# Patient Record
Sex: MALE | Race: WHITE | NOT HISPANIC OR LATINO | URBAN - METROPOLITAN AREA
[De-identification: names, ages, dates, MRNs, and addresses within clinical notes are randomized per-mention and may not be internally consistent; named-entity substitution may affect disease eponyms.]

---

## 2017-02-20 ENCOUNTER — OUTPATIENT (OUTPATIENT)
Dept: OUTPATIENT SERVICES | Facility: HOSPITAL | Age: 62
LOS: 1 days | Discharge: HOME | End: 2017-02-20

## 2017-06-27 DIAGNOSIS — E78.00 PURE HYPERCHOLESTEROLEMIA, UNSPECIFIED: ICD-10-CM

## 2017-06-27 DIAGNOSIS — I10 ESSENTIAL (PRIMARY) HYPERTENSION: ICD-10-CM

## 2017-06-27 DIAGNOSIS — E11.9 TYPE 2 DIABETES MELLITUS WITHOUT COMPLICATIONS: ICD-10-CM

## 2017-06-27 DIAGNOSIS — E66.9 OBESITY, UNSPECIFIED: ICD-10-CM

## 2017-06-27 DIAGNOSIS — K43.9 VENTRAL HERNIA WITHOUT OBSTRUCTION OR GANGRENE: ICD-10-CM

## 2017-06-27 DIAGNOSIS — K43.6 OTHER AND UNSPECIFIED VENTRAL HERNIA WITH OBSTRUCTION, WITHOUT GANGRENE: ICD-10-CM

## 2019-02-27 ENCOUNTER — INPATIENT (INPATIENT)
Facility: HOSPITAL | Age: 64
LOS: 2 days | Discharge: HOME HEALTH | End: 2019-03-02
Attending: INTERNAL MEDICINE | Admitting: INTERNAL MEDICINE
Payer: COMMERCIAL

## 2019-02-27 VITALS
HEART RATE: 79 BPM | RESPIRATION RATE: 18 BRPM | TEMPERATURE: 98 F | SYSTOLIC BLOOD PRESSURE: 159 MMHG | OXYGEN SATURATION: 98 % | DIASTOLIC BLOOD PRESSURE: 75 MMHG

## 2019-02-27 DIAGNOSIS — Z98.890 OTHER SPECIFIED POSTPROCEDURAL STATES: Chronic | ICD-10-CM

## 2019-02-27 LAB
ALBUMIN SERPL ELPH-MCNC: 3.7 G/DL — SIGNIFICANT CHANGE UP (ref 3.5–5.2)
ALP SERPL-CCNC: 92 U/L — SIGNIFICANT CHANGE UP (ref 30–115)
ALT FLD-CCNC: 13 U/L — SIGNIFICANT CHANGE UP (ref 0–41)
ANION GAP SERPL CALC-SCNC: 13 MMOL/L — SIGNIFICANT CHANGE UP (ref 7–14)
APTT BLD: 27.4 SEC — SIGNIFICANT CHANGE UP (ref 27–39.2)
AST SERPL-CCNC: 10 U/L — SIGNIFICANT CHANGE UP (ref 0–41)
BASOPHILS # BLD AUTO: 0.04 K/UL — SIGNIFICANT CHANGE UP (ref 0–0.2)
BASOPHILS NFR BLD AUTO: 0.3 % — SIGNIFICANT CHANGE UP (ref 0–1)
BILIRUB SERPL-MCNC: <0.2 MG/DL — SIGNIFICANT CHANGE UP (ref 0.2–1.2)
BLD GP AB SCN SERPL QL: SIGNIFICANT CHANGE UP
BUN SERPL-MCNC: 16 MG/DL — SIGNIFICANT CHANGE UP (ref 10–20)
CALCIUM SERPL-MCNC: 9.2 MG/DL — SIGNIFICANT CHANGE UP (ref 8.5–10.1)
CHLORIDE SERPL-SCNC: 103 MMOL/L — SIGNIFICANT CHANGE UP (ref 98–110)
CO2 SERPL-SCNC: 23 MMOL/L — SIGNIFICANT CHANGE UP (ref 17–32)
CREAT SERPL-MCNC: 0.9 MG/DL — SIGNIFICANT CHANGE UP (ref 0.7–1.5)
EOSINOPHIL # BLD AUTO: 0.45 K/UL — SIGNIFICANT CHANGE UP (ref 0–0.7)
EOSINOPHIL NFR BLD AUTO: 3 % — SIGNIFICANT CHANGE UP (ref 0–8)
GLUCOSE SERPL-MCNC: 185 MG/DL — HIGH (ref 70–99)
HCT VFR BLD CALC: 37 % — LOW (ref 42–52)
HGB BLD-MCNC: 11.7 G/DL — LOW (ref 14–18)
IMM GRANULOCYTES NFR BLD AUTO: 0.5 % — HIGH (ref 0.1–0.3)
INR BLD: 1.16 RATIO — SIGNIFICANT CHANGE UP (ref 0.65–1.3)
LYMPHOCYTES # BLD AUTO: 1.34 K/UL — SIGNIFICANT CHANGE UP (ref 1.2–3.4)
LYMPHOCYTES # BLD AUTO: 8.9 % — LOW (ref 20.5–51.1)
MCHC RBC-ENTMCNC: 27.1 PG — SIGNIFICANT CHANGE UP (ref 27–31)
MCHC RBC-ENTMCNC: 31.6 G/DL — LOW (ref 32–37)
MCV RBC AUTO: 85.6 FL — SIGNIFICANT CHANGE UP (ref 80–94)
MONOCYTES # BLD AUTO: 1.08 K/UL — HIGH (ref 0.1–0.6)
MONOCYTES NFR BLD AUTO: 7.2 % — SIGNIFICANT CHANGE UP (ref 1.7–9.3)
NEUTROPHILS # BLD AUTO: 12.03 K/UL — HIGH (ref 1.4–6.5)
NEUTROPHILS NFR BLD AUTO: 80.1 % — HIGH (ref 42.2–75.2)
NRBC # BLD: 0 /100 WBCS — SIGNIFICANT CHANGE UP (ref 0–0)
PLATELET # BLD AUTO: 388 K/UL — SIGNIFICANT CHANGE UP (ref 130–400)
POTASSIUM SERPL-MCNC: 4.2 MMOL/L — SIGNIFICANT CHANGE UP (ref 3.5–5)
POTASSIUM SERPL-SCNC: 4.2 MMOL/L — SIGNIFICANT CHANGE UP (ref 3.5–5)
PROT SERPL-MCNC: 6.9 G/DL — SIGNIFICANT CHANGE UP (ref 6–8)
PROTHROM AB SERPL-ACNC: 13.3 SEC — HIGH (ref 9.95–12.87)
RBC # BLD: 4.32 M/UL — LOW (ref 4.7–6.1)
RBC # FLD: 14.8 % — HIGH (ref 11.5–14.5)
SODIUM SERPL-SCNC: 139 MMOL/L — SIGNIFICANT CHANGE UP (ref 135–146)
TYPE + AB SCN PNL BLD: SIGNIFICANT CHANGE UP
WBC # BLD: 15.02 K/UL — HIGH (ref 4.8–10.8)
WBC # FLD AUTO: 15.02 K/UL — HIGH (ref 4.8–10.8)

## 2019-02-27 RX ORDER — DEXTROSE 50 % IN WATER 50 %
25 SYRINGE (ML) INTRAVENOUS ONCE
Qty: 0 | Refills: 0 | Status: DISCONTINUED | OUTPATIENT
Start: 2019-02-27 | End: 2019-03-02

## 2019-02-27 RX ORDER — INSULIN LISPRO 100/ML
VIAL (ML) SUBCUTANEOUS
Qty: 0 | Refills: 0 | Status: DISCONTINUED | OUTPATIENT
Start: 2019-02-27 | End: 2019-03-02

## 2019-02-27 RX ORDER — CIPROFLOXACIN LACTATE 400MG/40ML
400 VIAL (ML) INTRAVENOUS EVERY 12 HOURS
Qty: 0 | Refills: 0 | Status: DISCONTINUED | OUTPATIENT
Start: 2019-02-28 | End: 2019-02-28

## 2019-02-27 RX ORDER — INSULIN LISPRO 100/ML
4 VIAL (ML) SUBCUTANEOUS
Qty: 0 | Refills: 0 | Status: DISCONTINUED | OUTPATIENT
Start: 2019-02-27 | End: 2019-03-02

## 2019-02-27 RX ORDER — METRONIDAZOLE 500 MG
500 TABLET ORAL EVERY 8 HOURS
Qty: 0 | Refills: 0 | Status: DISCONTINUED | OUTPATIENT
Start: 2019-02-27 | End: 2019-03-02

## 2019-02-27 RX ORDER — METOPROLOL TARTRATE 50 MG
50 TABLET ORAL
Qty: 0 | Refills: 0 | Status: DISCONTINUED | OUTPATIENT
Start: 2019-02-27 | End: 2019-03-02

## 2019-02-27 RX ORDER — CANAGLIFLOZIN AND METFORMIN HYDROCHLORIDE 50; 500 MG/1; MG/1
0 TABLET, FILM COATED, EXTENDED RELEASE ORAL
Qty: 0 | Refills: 0 | COMMUNITY

## 2019-02-27 RX ORDER — INSULIN GLARGINE 100 [IU]/ML
12 INJECTION, SOLUTION SUBCUTANEOUS EVERY MORNING
Qty: 0 | Refills: 0 | Status: DISCONTINUED | OUTPATIENT
Start: 2019-02-27 | End: 2019-03-02

## 2019-02-27 RX ORDER — MORPHINE SULFATE 50 MG/1
4 CAPSULE, EXTENDED RELEASE ORAL ONCE
Qty: 0 | Refills: 0 | Status: DISCONTINUED | OUTPATIENT
Start: 2019-02-27 | End: 2019-02-27

## 2019-02-27 RX ORDER — SODIUM CHLORIDE 9 MG/ML
1000 INJECTION INTRAMUSCULAR; INTRAVENOUS; SUBCUTANEOUS
Qty: 0 | Refills: 0 | Status: DISCONTINUED | OUTPATIENT
Start: 2019-02-27 | End: 2019-02-28

## 2019-02-27 RX ORDER — CIPROFLOXACIN LACTATE 400MG/40ML
VIAL (ML) INTRAVENOUS
Qty: 0 | Refills: 0 | Status: DISCONTINUED | OUTPATIENT
Start: 2019-02-28 | End: 2019-02-28

## 2019-02-27 RX ORDER — METRONIDAZOLE 500 MG
500 TABLET ORAL EVERY 8 HOURS
Qty: 0 | Refills: 0 | Status: DISCONTINUED | OUTPATIENT
Start: 2019-02-27 | End: 2019-02-28

## 2019-02-27 RX ORDER — ASPIRIN/CALCIUM CARB/MAGNESIUM 324 MG
81 TABLET ORAL DAILY
Qty: 0 | Refills: 0 | Status: DISCONTINUED | OUTPATIENT
Start: 2019-02-27 | End: 2019-03-02

## 2019-02-27 RX ORDER — ACETAMINOPHEN 500 MG
650 TABLET ORAL EVERY 6 HOURS
Qty: 0 | Refills: 0 | Status: DISCONTINUED | OUTPATIENT
Start: 2019-02-27 | End: 2019-03-02

## 2019-02-27 RX ORDER — ENOXAPARIN SODIUM 100 MG/ML
40 INJECTION SUBCUTANEOUS EVERY 24 HOURS
Qty: 0 | Refills: 0 | Status: DISCONTINUED | OUTPATIENT
Start: 2019-02-27 | End: 2019-03-02

## 2019-02-27 RX ORDER — PANTOPRAZOLE SODIUM 20 MG/1
40 TABLET, DELAYED RELEASE ORAL
Qty: 0 | Refills: 0 | Status: DISCONTINUED | OUTPATIENT
Start: 2019-02-27 | End: 2019-03-02

## 2019-02-27 RX ORDER — CIPROFLOXACIN LACTATE 400MG/40ML
VIAL (ML) INTRAVENOUS
Qty: 0 | Refills: 0 | Status: DISCONTINUED | OUTPATIENT
Start: 2019-02-27 | End: 2019-02-28

## 2019-02-27 RX ORDER — CIPROFLOXACIN LACTATE 400MG/40ML
400 VIAL (ML) INTRAVENOUS ONCE
Qty: 0 | Refills: 0 | Status: COMPLETED | OUTPATIENT
Start: 2019-02-27 | End: 2019-02-27

## 2019-02-27 RX ORDER — KETOROLAC TROMETHAMINE 30 MG/ML
15 SYRINGE (ML) INJECTION EVERY 6 HOURS
Qty: 0 | Refills: 0 | Status: DISCONTINUED | OUTPATIENT
Start: 2019-02-27 | End: 2019-02-28

## 2019-02-27 RX ORDER — CIPROFLOXACIN LACTATE 400MG/40ML
500 VIAL (ML) INTRAVENOUS EVERY 12 HOURS
Qty: 0 | Refills: 0 | Status: DISCONTINUED | OUTPATIENT
Start: 2019-02-27 | End: 2019-02-27

## 2019-02-27 RX ADMIN — Medication 100 MILLIGRAM(S): at 21:29

## 2019-02-27 RX ADMIN — SODIUM CHLORIDE 150 MILLILITER(S): 9 INJECTION INTRAMUSCULAR; INTRAVENOUS; SUBCUTANEOUS at 23:04

## 2019-02-27 RX ADMIN — MORPHINE SULFATE 4 MILLIGRAM(S): 50 CAPSULE, EXTENDED RELEASE ORAL at 23:04

## 2019-02-27 RX ADMIN — MORPHINE SULFATE 4 MILLIGRAM(S): 50 CAPSULE, EXTENDED RELEASE ORAL at 22:35

## 2019-02-27 RX ADMIN — Medication 200 MILLIGRAM(S): at 22:35

## 2019-02-27 NOTE — H&P ADULT - ASSESSMENT
63M w/pmhx of DM, HTN, Diverticulosis, Chronic Diarrhea presents from home for chronic diarrhea and abdominal pain. Found to have Colonic Abscess     Colon Abscess:   Continue IV Cipro and Flagyl  Surgery Consult as requested by GI   GI follow up   ID C/s as requested by GI   Family to bring in imaging tomorrow   Regular diet   follow up blood cultures   Pain control with toradol and tylenol     HTN: Changed Bysolic to metoprolol    DM: Basal bolus insulin  keep FSG >180    GI ppx: PPI while taking toradol   DVT ppx: Lovenox   Full Code  Diet Regular   Activity as tolerated 63M w/pmhx of DM, HTN, Diverticulosis, Chronic Diarrhea presents from home for chronic diarrhea and abdominal pain. Found to have Colonic Abscess     Colon Abscess: Low suspicion for C Diff   Continue IV Cipro and Flagyl  Surgery Consult as requested by GI   GI follow up   ID C/s as requested by GI   Family to bring in imaging tomorrow   Regular diet   follow up blood cultures, stool O&P, and Stool cultures   Pain control with toradol and tylenol     HTN: Changed Bysolic to metoprolol    DM: Basal bolus insulin  keep FSG >180    GI ppx: PPI while taking toradol   DVT ppx: Lovenox   Full Code  Diet Regular   Activity as tolerated

## 2019-02-27 NOTE — H&P ADULT - NSHPLABSRESULTS_GEN_ALL_CORE
11.7   15.02 )-----------( 388      ( 27 Feb 2019 19:22 )             37.0       02-27    139  |  103  |  16  ----------------------------<  185<H>  4.2   |  23  |  0.9    Ca    9.2      27 Feb 2019 19:22    TPro  6.9  /  Alb  3.7  /  TBili  <0.2  /  DBili  x   /  AST  10  /  ALT  13  /  AlkPhos  92  02-27          PT/INR - ( 27 Feb 2019 19:22 )   PT: 13.30 sec;   INR: 1.16 ratio    PTT - ( 27 Feb 2019 19:22 )  PTT:27.4 sec

## 2019-02-27 NOTE — H&P ADULT - ATTENDING COMMENTS
pt seen and examined, I have read and agree with above exam and poa,  recent  coloscopy  divert abcess  npo  ivf  iv abx  surgical eval  ir eval  id/ gi eval    family at bedside, extensive discussion

## 2019-02-27 NOTE — ED PROVIDER NOTE - ATTENDING CONTRIBUTION TO CARE
64 yo M with history of DM II, HTN, history of diverticulitis, follows with Dr. Fischer, sent in for admission for complicated diverticulitis. Patient had diarrhea and pain similar to previous episodes and saw Aaliyah in office, had CT scan which demonstrated complicated diverticulitis with multiple abscesses. Dr. Fischer sent patient in for admission and IV abx. Requests cipro and flagyl, inpatient ID and surgical evaluations by Dr. Ritchie and Dr. Martinez. He specificially requests no repeat CT scan as he has the report from patient's CT 2 days ago.       Patient remains hemodynamically stable, labs with WBC of 15. Admitted as planned for continued management of complicated diverticulitis. Currently no indication for ICU eval, emergent surgical assessment. No signs of perforation on CXR.

## 2019-02-27 NOTE — ED PROVIDER NOTE - PHYSICAL EXAMINATION
CONSTITUTIONAL: Well-appearing; well-nourished; in no apparent distress.   EYES: PERRL; EOM intact.   CARDIOVASCULAR: Normal S1, S2; no murmurs, rubs, or gallops.   RESPIRATORY: Normal chest excursion with respiration; breath sounds clear and equal bilaterally; no wheezes, rhonchi, or rales.  GI/: + ttp to suprapubic/LLQ region. round and soft abdomen. no rebound and guarding. Normal bowel sounds;   MS: No evidence of trauma or deformity.   SKIN: Normal for age and race; warm; dry; good turgor; no apparent lesions or exudate.   NEURO/PSYCH: A & O x 4; grossly unremarkable.

## 2019-02-27 NOTE — ED PROVIDER NOTE - PROGRESS NOTE DETAILS
d/w with Dr Louis over the phone, patient has diverticulitis with abscesses to sigmoid colon. recommend IV flagyl and cipro, ID consult with Dr Fletcher and Surgery consult with Dr Martinez. admit to Dr Vega group (cover Dr Blas/Kev). GI consult with Dr Louis d/w with Dr Louis over the phone, patient has diverticulitis with abscesses to sigmoid colon. recommend IV flagyl and cipro, ID consult with Dr Fletcher and Surgery consult with Dr Martinez. admit to Dr Vega group (cover Dr Blas/Kev). GI consult with Dr Heriberto Louis also has the CD from [facility] 2 days ago and will bring it tomorrow and load it to the systme. No need to repeat CT scan today. d/w with Dr Fischer over the phone, patient has diverticulitis with abscesses to sigmoid colon. recommend IV flagyl and cipro, ID consult with Dr Fletcher and Surgery consult with Dr Martinez. admit to Dr Vega group (cover Dr Blas/Kev). GI consult with Dr Heriberto Fischer also has the CD from [facility] 2 days ago and will bring it tomorrow and load it to the systme. No need to repeat CT scan today.

## 2019-02-27 NOTE — H&P ADULT - NSHPPHYSICALEXAM_GEN_ALL_CORE
GENERAL: NAD, obese, Non-toxic, stated age   HEAD:  Atraumatic, Normocephalic  EYES: EOMI, Sclera White   CHEST/LUNG: Clear to auscultation bilaterally; No wheezing, rhonchi, or crackles  HEART: Regular rate and rhythm; s1, s2, No murmurs, rubs, or gallops  ABDOMEN: Soft, lower quadrant ttp b/l, Nondistended; Bowel sounds present, No rebound or guarding noted. large reducible ventral hernia noted   EXTREMITIES:  No lower extremity edema or calf tenderness to palpation.  No clubbing or cyanosis  PSYCH: AAOx3  NEUROLOGY: non-focal  SKIN: No rashes or lesions GENERAL: NAD, obese, Non-toxic, stated age   HEAD:  Atraumatic, Normocephalic  EYES: EOMI, Sclera White   CHEST/LUNG: Clear to auscultation bilaterally; No wheezing, rhonchi, or crackles  HEART: Regular rate and rhythm; s1, s2, No murmurs, rubs, or gallops  ABDOMEN: Soft, lower quadrant ttp b/l, Nondistended; Bowel sounds present, No rebound or guarding noted. large reducible ventral hernia noted   EXTREMITIES:  No lower extremity edema or calf tenderness to palpation.  No clubbing or cyanosis  PSYCH: AAOx3  NEUROLOGY: non-focal  SKIN: No rashes or lesions on exposed areas

## 2019-02-27 NOTE — ED PROVIDER NOTE - CLINICAL SUMMARY MEDICAL DECISION MAKING FREE TEXT BOX
Patient remains hemodynamically stable, labs with WBC of 15. Admitted as planned for continued management of complicated diverticulitis. Currently no indication for ICU eval, emergent surgical assessment. No signs of perforation on CXR.

## 2019-02-27 NOTE — H&P ADULT - NSHPSOCIALHISTORY_GEN_ALL_CORE
Marital Status:  ( x  )    (   ) Single    (   )    (  )   Lives with: (  ) alone  (  ) children   ( x ) spouse   (  ) parents  (  ) other  Recent Travel: None     Substance Use (street drugs): ( x ) never used  (  ) other:  Tobacco Usage:  (  x ) never smoked   (   ) former smoker   (   ) current smoker  (     ) pack year  Alcohol Usage: Denied

## 2019-02-27 NOTE — ED ADULT NURSE NOTE - OBJECTIVE STATEMENT
The patient is a 63y Male complaining of diarrhea, abdominal pain, nausea and weight loss x 2 months. As per patient he was told my Dr Fischer that he needs IV antibiotics from an abscess located in central pelvis found on CT scan of abdomen and pelvis. Patient denies any nausea, vomiting, fever, chills, shortness of breath or chest pain. Patient reports last episode of diarrhea today

## 2019-02-27 NOTE — H&P ADULT - HISTORY OF PRESENT ILLNESS
63M w/pmhx of DM, HTN, Diverticulosis, Chronic Diarrhea presents from home for chronic diarrhea and abdominal pain. Patient states the diarrhea has been persistent for the last two months, non-bloody, and associated with a 26 lbs weight loss.  Patient had a C-Scope ~3weeks ago which he reported as normal. Diarrhea persisted and he was referred to Dr. Weiss who did a CT a/p as out patient which revealed a larry-colonic abscess too small to drain. He had him sent in for IV abx and surgical consultation.   Patient denied fever, chills, recent sick contacts, travel, n/v, history of colitis, aggravating or alleviating factors of diarrhea, CP, SOB, dizziness, LOC. Patient has been maintaining PO intake. No family history of GI issues or cancers     In Ed: HR: 79    T:98.3     BP: 159/75     RR: 18, 98% on RA   Given Morphine, Fluids at 150/hr, Cipro and Flagyl

## 2019-02-27 NOTE — ED PROVIDER NOTE - OBJECTIVE STATEMENT
64 yo male hx of diverticulitis/HTN/DM present c/o lower abdominal pain with loose stool for 2 months. patient had outpatient CT and found diverticulitis with abscesses to sigmoid colon. 62 yo male hx of diverticulitis/HTN/DM present c/o lower abdominal pain with loose stool for 2 months. patient had outpatient CT and found diverticulitis with abscesses to sigmoid colon so he was sent in by his GI Dr Fischer. Patient reported he had had this pain for a while and the pain radiating from front to back. the pain states the same and worsen with palpation. Denies fever/chill/HA/dizziness/chest pain/palpitation/sob/ black stool/bloody stool/urinary sxs

## 2019-02-28 ENCOUNTER — TRANSCRIPTION ENCOUNTER (OUTPATIENT)
Age: 64
End: 2019-02-28

## 2019-02-28 LAB
ALBUMIN SERPL ELPH-MCNC: 3.4 G/DL — LOW (ref 3.5–5.2)
ALP SERPL-CCNC: 89 U/L — SIGNIFICANT CHANGE UP (ref 30–115)
ALT FLD-CCNC: 12 U/L — SIGNIFICANT CHANGE UP (ref 0–41)
ANION GAP SERPL CALC-SCNC: 12 MMOL/L — SIGNIFICANT CHANGE UP (ref 7–14)
AST SERPL-CCNC: 9 U/L — SIGNIFICANT CHANGE UP (ref 0–41)
BASOPHILS # BLD AUTO: 0.06 K/UL — SIGNIFICANT CHANGE UP (ref 0–0.2)
BASOPHILS NFR BLD AUTO: 0.4 % — SIGNIFICANT CHANGE UP (ref 0–1)
BILIRUB SERPL-MCNC: 0.4 MG/DL — SIGNIFICANT CHANGE UP (ref 0.2–1.2)
BUN SERPL-MCNC: 14 MG/DL — SIGNIFICANT CHANGE UP (ref 10–20)
CALCIUM SERPL-MCNC: 8.9 MG/DL — SIGNIFICANT CHANGE UP (ref 8.5–10.1)
CHLORIDE SERPL-SCNC: 102 MMOL/L — SIGNIFICANT CHANGE UP (ref 98–110)
CO2 SERPL-SCNC: 24 MMOL/L — SIGNIFICANT CHANGE UP (ref 17–32)
CREAT SERPL-MCNC: 0.9 MG/DL — SIGNIFICANT CHANGE UP (ref 0.7–1.5)
EOSINOPHIL # BLD AUTO: 0.41 K/UL — SIGNIFICANT CHANGE UP (ref 0–0.7)
EOSINOPHIL NFR BLD AUTO: 3 % — SIGNIFICANT CHANGE UP (ref 0–8)
ESTIMATED AVERAGE GLUCOSE: 209 MG/DL — HIGH (ref 68–114)
GLUCOSE BLDC GLUCOMTR-MCNC: 123 MG/DL — HIGH (ref 70–99)
GLUCOSE BLDC GLUCOMTR-MCNC: 168 MG/DL — HIGH (ref 70–99)
GLUCOSE BLDC GLUCOMTR-MCNC: 170 MG/DL — HIGH (ref 70–99)
GLUCOSE BLDC GLUCOMTR-MCNC: 191 MG/DL — HIGH (ref 70–99)
GLUCOSE SERPL-MCNC: 161 MG/DL — HIGH (ref 70–99)
HBA1C BLD-MCNC: 8.9 % — HIGH (ref 4–5.6)
HCT VFR BLD CALC: 36.2 % — LOW (ref 42–52)
HGB BLD-MCNC: 11.5 G/DL — LOW (ref 14–18)
IMM GRANULOCYTES NFR BLD AUTO: 0.5 % — HIGH (ref 0.1–0.3)
LYMPHOCYTES # BLD AUTO: 1.14 K/UL — LOW (ref 1.2–3.4)
LYMPHOCYTES # BLD AUTO: 8.2 % — LOW (ref 20.5–51.1)
MAGNESIUM SERPL-MCNC: 1.7 MG/DL — LOW (ref 1.8–2.4)
MCHC RBC-ENTMCNC: 27.1 PG — SIGNIFICANT CHANGE UP (ref 27–31)
MCHC RBC-ENTMCNC: 31.8 G/DL — LOW (ref 32–37)
MCV RBC AUTO: 85.2 FL — SIGNIFICANT CHANGE UP (ref 80–94)
MONOCYTES # BLD AUTO: 0.84 K/UL — HIGH (ref 0.1–0.6)
MONOCYTES NFR BLD AUTO: 6 % — SIGNIFICANT CHANGE UP (ref 1.7–9.3)
NEUTROPHILS # BLD AUTO: 11.37 K/UL — HIGH (ref 1.4–6.5)
NEUTROPHILS NFR BLD AUTO: 81.9 % — HIGH (ref 42.2–75.2)
NRBC # BLD: 0 /100 WBCS — SIGNIFICANT CHANGE UP (ref 0–0)
PLATELET # BLD AUTO: 370 K/UL — SIGNIFICANT CHANGE UP (ref 130–400)
POTASSIUM SERPL-MCNC: 4.3 MMOL/L — SIGNIFICANT CHANGE UP (ref 3.5–5)
POTASSIUM SERPL-SCNC: 4.3 MMOL/L — SIGNIFICANT CHANGE UP (ref 3.5–5)
PROT SERPL-MCNC: 6.6 G/DL — SIGNIFICANT CHANGE UP (ref 6–8)
RBC # BLD: 4.25 M/UL — LOW (ref 4.7–6.1)
RBC # FLD: 14.9 % — HIGH (ref 11.5–14.5)
SODIUM SERPL-SCNC: 138 MMOL/L — SIGNIFICANT CHANGE UP (ref 135–146)
WBC # BLD: 13.89 K/UL — HIGH (ref 4.8–10.8)
WBC # FLD AUTO: 13.89 K/UL — HIGH (ref 4.8–10.8)

## 2019-02-28 RX ORDER — MORPHINE SULFATE 50 MG/1
2 CAPSULE, EXTENDED RELEASE ORAL EVERY 4 HOURS
Qty: 0 | Refills: 0 | Status: DISCONTINUED | OUTPATIENT
Start: 2019-02-28 | End: 2019-03-02

## 2019-02-28 RX ORDER — POLYETHYLENE GLYCOL 3350 17 G/17G
17 POWDER, FOR SOLUTION ORAL
Qty: 0 | Refills: 0 | Status: DISCONTINUED | OUTPATIENT
Start: 2019-02-28 | End: 2019-03-02

## 2019-02-28 RX ORDER — LACTOBACILLUS ACIDOPHILUS 100MM CELL
1 CAPSULE ORAL DAILY
Qty: 0 | Refills: 0 | Status: DISCONTINUED | OUTPATIENT
Start: 2019-02-28 | End: 2019-03-02

## 2019-02-28 RX ORDER — CHLORHEXIDINE GLUCONATE 213 G/1000ML
1 SOLUTION TOPICAL
Qty: 0 | Refills: 0 | Status: DISCONTINUED | OUTPATIENT
Start: 2019-02-28 | End: 2019-03-02

## 2019-02-28 RX ORDER — DOCUSATE SODIUM 100 MG
100 CAPSULE ORAL THREE TIMES A DAY
Qty: 0 | Refills: 0 | Status: DISCONTINUED | OUTPATIENT
Start: 2019-02-28 | End: 2019-03-02

## 2019-02-28 RX ORDER — CIPROFLOXACIN LACTATE 400MG/40ML
400 VIAL (ML) INTRAVENOUS EVERY 12 HOURS
Qty: 0 | Refills: 0 | Status: DISCONTINUED | OUTPATIENT
Start: 2019-02-28 | End: 2019-02-28

## 2019-02-28 RX ORDER — CIPROFLOXACIN LACTATE 400MG/40ML
400 VIAL (ML) INTRAVENOUS EVERY 12 HOURS
Qty: 0 | Refills: 0 | Status: DISCONTINUED | OUTPATIENT
Start: 2019-02-28 | End: 2019-03-01

## 2019-02-28 RX ORDER — SENNA PLUS 8.6 MG/1
2 TABLET ORAL AT BEDTIME
Qty: 0 | Refills: 0 | Status: DISCONTINUED | OUTPATIENT
Start: 2019-02-28 | End: 2019-03-02

## 2019-02-28 RX ORDER — CIPROFLOXACIN LACTATE 400MG/40ML
400 VIAL (ML) INTRAVENOUS ONCE
Qty: 0 | Refills: 0 | Status: COMPLETED | OUTPATIENT
Start: 2019-02-28 | End: 2019-02-28

## 2019-02-28 RX ADMIN — MORPHINE SULFATE 2 MILLIGRAM(S): 50 CAPSULE, EXTENDED RELEASE ORAL at 15:23

## 2019-02-28 RX ADMIN — Medication 15 MILLIGRAM(S): at 13:51

## 2019-02-28 RX ADMIN — PANTOPRAZOLE SODIUM 40 MILLIGRAM(S): 20 TABLET, DELAYED RELEASE ORAL at 08:09

## 2019-02-28 RX ADMIN — Medication 50 MILLIGRAM(S): at 05:21

## 2019-02-28 RX ADMIN — Medication 4 UNIT(S): at 08:08

## 2019-02-28 RX ADMIN — Medication 15 MILLIGRAM(S): at 11:56

## 2019-02-28 RX ADMIN — Medication 1 TABLET(S): at 17:23

## 2019-02-28 RX ADMIN — Medication 2: at 17:25

## 2019-02-28 RX ADMIN — MORPHINE SULFATE 2 MILLIGRAM(S): 50 CAPSULE, EXTENDED RELEASE ORAL at 21:37

## 2019-02-28 RX ADMIN — Medication 4 UNIT(S): at 11:55

## 2019-02-28 RX ADMIN — Medication 50 MILLIGRAM(S): at 17:23

## 2019-02-28 RX ADMIN — Medication 200 MILLIGRAM(S): at 05:21

## 2019-02-28 RX ADMIN — POLYETHYLENE GLYCOL 3350 17 GRAM(S): 17 POWDER, FOR SOLUTION ORAL at 17:23

## 2019-02-28 RX ADMIN — ENOXAPARIN SODIUM 40 MILLIGRAM(S): 100 INJECTION SUBCUTANEOUS at 12:02

## 2019-02-28 RX ADMIN — Medication 4 UNIT(S): at 17:25

## 2019-02-28 RX ADMIN — Medication 100 MILLIGRAM(S): at 21:38

## 2019-02-28 RX ADMIN — Medication 100 MILLIGRAM(S): at 05:20

## 2019-02-28 RX ADMIN — Medication 200 MILLIGRAM(S): at 17:26

## 2019-02-28 RX ADMIN — SENNA PLUS 2 TABLET(S): 8.6 TABLET ORAL at 21:38

## 2019-02-28 RX ADMIN — Medication 200 MILLIGRAM(S): at 04:17

## 2019-02-28 RX ADMIN — Medication 81 MILLIGRAM(S): at 11:55

## 2019-02-28 RX ADMIN — Medication 15 MILLIGRAM(S): at 04:29

## 2019-02-28 RX ADMIN — MORPHINE SULFATE 2 MILLIGRAM(S): 50 CAPSULE, EXTENDED RELEASE ORAL at 14:13

## 2019-02-28 RX ADMIN — Medication 2: at 11:55

## 2019-02-28 RX ADMIN — INSULIN GLARGINE 12 UNIT(S): 100 INJECTION, SOLUTION SUBCUTANEOUS at 12:04

## 2019-02-28 RX ADMIN — Medication 100 MILLIGRAM(S): at 15:23

## 2019-02-28 RX ADMIN — Medication 2: at 08:09

## 2019-02-28 NOTE — DISCHARGE NOTE ADULT - CARE PLAN
Principal Discharge DX:	Diverticulitis of intestine with abscess  Goal:	Resolution  Assessment and plan of treatment:	You had a diverticulitis complicated with abscess. You are not a candidate for surgery or drainage by interventional radiology. For now, you will need antibiotics through you IV line (Invanz) for 3 weeks and Flagyl oral for 3 weeks. After 3 weeks you will need to follow up with Dr. Davison from infectious disease (number below) in order to repeat a CT scan and from there decide on future recommendations. If the recommendations is for more antibiotics, you will need PICC line as a midline lasts for 4 weeks then needs to be removed due to risks of blood stream infections. If that is the case ask for a referral to interventional radiology for outpatient PICC line placement.   Continue with a soft diet for now, with low residue and high fiber (provided with diet examples) and follow up with Dr. Fischer within 1 week for future diet recommendations. After the 3 weeks if there is no improvement, you might need an appointment with surgery (number below) for an eventual drainage of the abscess.

## 2019-02-28 NOTE — CONSULT NOTE ADULT - SUBJECTIVE AND OBJECTIVE BOX
Patient is a 62 y/o with PMHx of DM, HTN, Diverticulosis, that developed abdominal pain over the last two months. Patient was seen by Dr. Paredes as outpatient and Colonoscopy was done. Per patient he was told that colonoscopy wasn't revealing of much. He had persistent abdominal pain and weight loss so he was started on cipro and flagyl by pmd . He still wasn't having any improvement so he presented to Dr. Fischer for a second opinion. CT scan was done  and revealing of larry-colonic abscess. Patient currently feels well.  Patient denies any current fever, chills, headache, dizziness, change in vision/hearing/speech, cough, SOB, chest pain, palpitations,  burning on urination, swelling of lower extremities, itchiness, or change in skin color.    Vital Signs:  T(F): 98  HR: 81  BP: 159/87  RR: 17  SpO2: 98%  Physical Exam  Gen: NAD  HEENT: NC/AT  Cardio: S1/S2 No S3/S4, Regular  Resp: CTA B/L  Abdomen: Soft, ND/NT  Neuro: AAOx3  Extremities: FROM x 4                          11.7   15.02 )-----------( 388      ( 27 Feb 2019 19:22 )             37.0   02-27    139  |  103  |  16  ----------------------------<  185<H>  4.2   |  23  |  0.9    Ca    9.2      27 Feb 2019 19:22    TPro  6.9  /  Alb  3.7  /  TBili  <0.2  /  DBili  x   /  AST  10  /  ALT  13  /  AlkPhos  92  02-27

## 2019-02-28 NOTE — DISCHARGE NOTE ADULT - CARE PROVIDER_API CALL
Fortunato Fischer)  Gastroenterology; Internal Medicine  [address] [place] [address]  Phone: [phone]  Fax: [phone]  Follow Up Time:     Amilcar Rosales)  Internal Medicine  [address] [place] [address]  Phone: [phone]  Fax: [phone]  Follow Up Time:     Robert Martinez)  ColonRectal Surgery  [address] [place] [address], [address] [place] [address]  Phone: [phone]  Fax: [phone]  Follow Up Time:

## 2019-02-28 NOTE — DISCHARGE NOTE ADULT - CARE PROVIDERS DIRECT ADDRESSES
,DirectAddress_Unknown,DirectAddress_Unknown,[email or web address]

## 2019-02-28 NOTE — DISCHARGE NOTE ADULT - PATIENT PORTAL LINK FT
You can access the [facility] Patient Portal, offered by [facility], by registering with the following website: [email or web address]

## 2019-02-28 NOTE — DISCHARGE NOTE ADULT - MEDICATION SUMMARY - MEDICATIONS TO TAKE
I will START or STAY ON the medications listed below when I get home from the hospital:    Flagyl 500 mg oral tablet  -- 1 tab(s) by mouth every 8 hours   -- Do not drink alcoholic beverages when taking this medication.  Finish all this medication unless otherwise directed by prescriber.  May discolor urine or feces.    -- Indication: For Diverticulosis    Aspirin Enteric Coated 81 mg oral delayed release tablet  -- 1 tab(s) by mouth once a day  -- Indication: For Coronary artery disease     Invokamet 150 mg-1000 mg oral tablet  -- 1 tab(s) by mouth 2 times a day (with meals)  -- Indication: For Diabetes    Bystolic 10 mg oral tablet  -- 1 tab(s) by mouth once a day  -- Indication: For Hypertension    ertapenem 1 g injection  -- 1 gram(s) injectable once a day   -- Indication: For DIVERTICULITIS OF INTESTINE WITH ABSCESS    lactobacillus acidophilus oral capsule  -- 1 tab(s) by mouth once a day   -- Indication: For DIVERTICULITIS OF INTESTINE WITH ABSCESS    pantoprazole 40 mg oral delayed release tablet  -- 1 tab(s) by mouth once a day (before a meal)  -- Indication: For Gastric protection

## 2019-02-28 NOTE — CONSULT NOTE ADULT - ATTENDING COMMENTS
As per resident note, 63yM w/ PMHx of DM, HTN, Diverticulosis, Chronic Diarrhea who presented with abdominal pain over the last two months.  He had persistent abdominal pain and weight loss so he was started on cipro and flagyl by PMD.   CT scan revealed diverticulitis with larry-colonic abscess too small to drain. Pt still has complaint of abdominal discomfort.     Afeb, vss    Abd soft, mild LLQ tenderness, no guarding or rebound    WBC 13.89    CT AB/PEL reviewed    Imp: acute perforated diverticulitis with small abscess  Rec: No surgical intervention at this time   continue IV abx  reduce diet...clear liquids now, adv to LOW residue/LOW fiber as tolerated  agree with plans for outpatient IV abx  will follow with you while he is here  He will follow up with me as an outpatient once he is discharged.

## 2019-02-28 NOTE — CONSULT NOTE ADULT - SUBJECTIVE AND OBJECTIVE BOX
GENERAL SURGERY CONSULT NOTE    Patient: TIA GORDON , 63y ([birth year])Male   MRN: [id number]  Location: [facility] T4-3B 023 B  Visit: 02-27-19 Inpatient  Date: 02-28-19 @ 13:07    HPI:  63M w/pmhx of DM, HTN, Diverticulosis, Chronic Diarrhea presents from home for chronic diarrhea and abdominal pain. Patient states the diarrhea has been persistent for the last two months, non-bloody, and associated with a 26 lbs weight loss.  Patient had a C-Scope ~3weeks ago which he reported as normal. Diarrhea persisted and he was referred to Dr. Weiss who did a CT a/p as out patient which revealed a larry-colonic abscess too small to drain. He had him sent in for IV abx and surgical consultation.   Patient denied fever, chills, recent sick contacts, travel, n/v, history of colitis, aggravating or alleviating factors of diarrhea, CP, SOB, dizziness, LOC. Patient has been maintaining PO intake. No family history of GI issues or cancers     In Ed: HR: 79    T:98.3     BP: 159/75     RR: 18, 98% on RA   Given Morphine, Fluids at 150/hr, Cipro and Flagyl (27 Feb 2019 23:17)    PAST MEDICAL & SURGICAL HISTORY:  Diverticulosis  Diarrhea  Diabetes  Hypertension  Hx of repair of incarcerated ventral hernia w/ Mesh (2/20/2017 Dr. Montgomery)    Home Medications:  Aspirin Enteric Coated 81 mg oral delayed release tablet: 1 tab(s) orally once a day (27 Feb 2019 23:51)  Bystolic 10 mg oral tablet: 1 tab(s) orally once a day (27 Feb 2019 23:51)  Invokamet 150 mg-1000 mg oral tablet: 1 tab(s) orally 2 times a day (with meals) (27 Feb 2019 23:51)    VITALS:  T(F): 98.1 (02-28-19 @ 05:09), Max: 99 (02-28-19 @ 00:30)  HR: 81 (02-28-19 @ 05:09) (79 - 85)  BP: 159/87 (02-28-19 @ 05:09) (159/75 - 167/89)  RR: 17 (02-28-19 @ 05:09) (17 - 18)  SpO2: 96% (02-28-19 @ 10:07) (96% - 98%)    PHYSICAL EXAM:  General: NAD, AAOx3, calm and cooperative  HEENT: NCAT, FARZANA, EOMI, Trachea ML, Neck supple  Cardiac: RRR S1, S2, no Murmurs, rubs or gallops  Respiratory: CTAB, normal respiratory effort, breath sounds equal BL, no wheeze, rhonchi or crackles  Abdomen: Soft, non-distended, non-tender, no rebound, no guarding. +BS.  Rectal: Good tone, +stool, no blood, no larry-anal masses/lesions, no fistulas, fissures, hemorrhoids  Musculoskeletal: Strength 5/5 BL UE/LE, ROM intact, compartments soft  Neuro: Sensation grossly intact and equal throughout, no focal deficits  Vascular: Pulses 2+ throughout, extremities well perfused  Skin: Warm/dry, normal color, no jaundice  Incision/wound: healing well, dressings in place, clean, dry and intact    MEDICATIONS  (STANDING):  aspirin enteric coated 81 milliGRAM(s) Oral daily  chlorhexidine 4% Liquid 1 Application(s) Topical <User Schedule>  ciprofloxacin   IVPB 400 milliGRAM(s) IV Intermittent every 12 hours  dextrose 50% Injectable 25 Gram(s) IV Push once  enoxaparin Injectable 40 milliGRAM(s) SubCutaneous every 24 hours  insulin glargine Injectable (LANTUS) 12 Unit(s) SubCutaneous every morning  insulin lispro (HumaLOG) corrective regimen sliding scale   SubCutaneous three times a day before meals  insulin lispro Injectable (HumaLOG) 4 Unit(s) SubCutaneous three times a day before meals  metoprolol tartrate 50 milliGRAM(s) Oral two times a day  metroNIDAZOLE  IVPB 500 milliGRAM(s) IV Intermittent every 8 hours  pantoprazole    Tablet 40 milliGRAM(s) Oral before breakfast    MEDICATIONS  (PRN):  acetaminophen   Tablet .. 650 milliGRAM(s) Oral every 6 hours PRN Mild Pain (1 - 3)  ketorolac   Injectable 15 milliGRAM(s) IV Push every 6 hours PRN Severe Pain (7 - 10)    LAB/STUDIES:                        11.5   13.89 )-----------( 370      ( 28 Feb 2019 08:03 )             36.2     02-28    138  |  102  |  14  ----------------------------<  161<H>  4.3   |  24  |  0.9    Ca    8.9      28 Feb 2019 08:03  Mg     1.7     02-28    TPro  6.6  /  Alb  3.4<L>  /  TBili  0.4  /  DBili  x   /  AST  9   /  ALT  12  /  AlkPhos  89  02-28    PT/INR - ( 27 Feb 2019 19:22 )   PT: 13.30 sec;   INR: 1.16 ratio         PTT - ( 27 Feb 2019 19:22 )  PTT:27.4 sec  LIVER FUNCTIONS - ( 28 Feb 2019 08:03 )  Alb: 3.4 g/dL / Pro: 6.6 g/dL / ALK PHOS: 89 U/L / ALT: 12 U/L / AST: 9 U/L / GGT: x           IMAGING:  Outpatient CTAP to be uploaded to PACS      ASSESSMENT:  63yM w/ PMHx of DM, HTN, Diverticulosis, Chronic Diarrhea who presented with abdominal pain over the last two months. Patient was seen by Dr. Paredes as outpatient and Colonoscopy was done. Per patient he was told that colonoscopy wasn't revealing of much. He had persistent abdominal pain and weight loss so he was started on cipro and flagyl by pmd . He still wasn't having any improvement so he presented to Dr. Fischer for a second opinion. CT scan was done  and revealing of larry-colonic abscess too small to drain. GI following. Patient currenlty on cipro/flagyl, tolerating diet, +BM/Flatus, Minimal tenderness.     PLAN:  - Will review CTAP once uploaded to PACS, was told   - Continue Antibiotics  - Candidate for surgery in 2-3 months, due to his hx of complicated diverticulitis. GENERAL SURGERY CONSULT NOTE    Patient: TIA GORDON , 63y ([birth year])Male   MRN: [id number]  Location: [facility] T4-3B 023 B  Visit: 02-27-19 Inpatient  Date: 02-28-19 @ 13:07    HPI:  63M w/pmhx of DM, HTN, Diverticulosis, Chronic Diarrhea presents from home for chronic diarrhea and abdominal pain. Patient states the diarrhea has been persistent for the last two months, non-bloody, and associated with a 26 lbs weight loss.  Patient had a C-Scope ~3weeks ago which he reported as normal. Diarrhea persisted and he was referred to Dr. Weiss who did a CT a/p as out patient which revealed a larry-colonic abscess too small to drain. He had him sent in for IV abx and surgical consultation.   Patient denied fever, chills, recent sick contacts, travel, n/v, history of colitis, aggravating or alleviating factors of diarrhea, CP, SOB, dizziness, LOC. Patient has been maintaining PO intake. No family history of GI issues or cancers     In Ed: HR: 79    T:98.3     BP: 159/75     RR: 18, 98% on RA   Given Morphine, Fluids at 150/hr, Cipro and Flagyl (27 Feb 2019 23:17)    PAST MEDICAL & SURGICAL HISTORY:  Diverticulosis  Diarrhea  Diabetes  Hypertension  Hx of repair of incarcerated ventral hernia w/ Mesh (2/20/2017 Dr. Montgomery)    Home Medications:  Aspirin Enteric Coated 81 mg oral delayed release tablet: 1 tab(s) orally once a day (27 Feb 2019 23:51)  Bystolic 10 mg oral tablet: 1 tab(s) orally once a day (27 Feb 2019 23:51)  Invokamet 150 mg-1000 mg oral tablet: 1 tab(s) orally 2 times a day (with meals) (27 Feb 2019 23:51)    VITALS:  T(F): 98.1 (02-28-19 @ 05:09), Max: 99 (02-28-19 @ 00:30)  HR: 81 (02-28-19 @ 05:09) (79 - 85)  BP: 159/87 (02-28-19 @ 05:09) (159/75 - 167/89)  RR: 17 (02-28-19 @ 05:09) (17 - 18)  SpO2: 96% (02-28-19 @ 10:07) (96% - 98%)    PHYSICAL EXAM:  General: NAD, AAOx3, calm and cooperative  Cardiac: RRR  Respiratory: CTAB, normal respiratory effort, breath sounds equal BL, no wheeze, rhonchi or crackles  Abdomen: Soft, Mild-distended, Mild lower abdominal tenderness more in LLQ , no rebound, no guarding. +BS.    MEDICATIONS  (STANDING):  aspirin enteric coated 81 milliGRAM(s) Oral daily  chlorhexidine 4% Liquid 1 Application(s) Topical <User Schedule>  ciprofloxacin   IVPB 400 milliGRAM(s) IV Intermittent every 12 hours  dextrose 50% Injectable 25 Gram(s) IV Push once  enoxaparin Injectable 40 milliGRAM(s) SubCutaneous every 24 hours  insulin glargine Injectable (LANTUS) 12 Unit(s) SubCutaneous every morning  insulin lispro (HumaLOG) corrective regimen sliding scale   SubCutaneous three times a day before meals  insulin lispro Injectable (HumaLOG) 4 Unit(s) SubCutaneous three times a day before meals  metoprolol tartrate 50 milliGRAM(s) Oral two times a day  metroNIDAZOLE  IVPB 500 milliGRAM(s) IV Intermittent every 8 hours  pantoprazole    Tablet 40 milliGRAM(s) Oral before breakfast    MEDICATIONS  (PRN):  acetaminophen   Tablet .. 650 milliGRAM(s) Oral every 6 hours PRN Mild Pain (1 - 3)  ketorolac   Injectable 15 milliGRAM(s) IV Push every 6 hours PRN Severe Pain (7 - 10)    LAB/STUDIES:                        11.5   13.89 )-----------( 370      ( 28 Feb 2019 08:03 )             36.2     02-28    138  |  102  |  14  ----------------------------<  161<H>  4.3   |  24  |  0.9    Ca    8.9      28 Feb 2019 08:03  Mg     1.7     02-28    TPro  6.6  /  Alb  3.4<L>  /  TBili  0.4  /  DBili  x   /  AST  9   /  ALT  12  /  AlkPhos  89  02-28    PT/INR - ( 27 Feb 2019 19:22 )   PT: 13.30 sec;   INR: 1.16 ratio         PTT - ( 27 Feb 2019 19:22 )  PTT:27.4 sec  LIVER FUNCTIONS - ( 28 Feb 2019 08:03 )  Alb: 3.4 g/dL / Pro: 6.6 g/dL / ALK PHOS: 89 U/L / ALT: 12 U/L / AST: 9 U/L / GGT: x           IMAGING:  CTAP reviewed: acute diverticulitis w/ small pericolonic abscess    ASSESSMENT:  63yM w/ PMHx of DM, HTN, Diverticulosis, Chronic Diarrhea who presented with abdominal pain over the last two months. Patient was seen by Dr. Paredes as outpatient and Colonoscopy was done. Per patient he was told that colonoscopy wasn't revealing of much (diverticulosis) . He had persistent abdominal pain and weight loss so he was started on cipro and flagyl by PMD. He still wasn't having any improvement so he presented to Dr. Fischer for a second opinion. CT scan was done  and revealing of larry-colonic abscess too small to drain. GI following. Patient currenlty on cipro/flagyl, tolerating diet, +BM/Flatus, Minimal lower abdomen tenderness. CTAP reviewed     PLAN:  - Consider ID consult for change of ABx, since failed PO cipro/flagyl as outpatient. Scheduled to get PICC.   - Candidate for surgery in 2-3 months, due to his hx of complicated diverticulitis.

## 2019-02-28 NOTE — DISCHARGE NOTE ADULT - PLAN OF CARE
Resolution You had a diverticulitis complicated with abscess. You are not a candidate for surgery or drainage by interventional radiology. For now, you will need antibiotics through you IV line (Invanz) for 3 weeks and Flagyl oral for 3 weeks. After 3 weeks you will need to follow up with Dr. Davison from infectious disease (number below) in order to repeat a CT scan and from there decide on future recommendations. If the recommendations is for more antibiotics, you will need PICC line as a midline lasts for 4 weeks then needs to be removed due to risks of blood stream infections. If that is the case ask for a referral to interventional radiology for outpatient PICC line placement.   Continue with a soft diet for now, with low residue and high fiber (provided with diet examples) and follow up with Dr. Fischer within 1 week for future diet recommendations. After the 3 weeks if there is no improvement, you might need an appointment with surgery (number below) for an eventual drainage of the abscess.

## 2019-02-28 NOTE — DISCHARGE NOTE ADULT - PROVIDER TOKENS
PROVIDER:[TOKEN:[[id number]]],PROVIDER:[TOKEN:[[id number]]],PROVIDER:[TOKEN:[[id number]]]

## 2019-02-28 NOTE — DISCHARGE NOTE ADULT - ADDITIONAL INSTRUCTIONS
1-F/u with Dr Davison [phone] at [address] [facility]  2-Follow up with Dr. Fischer in 1 week   3-Follow up with your primary care physician within 3 weeks for repeat blood work and to make sure the line is clean and not infected

## 2019-02-28 NOTE — DISCHARGE NOTE ADULT - HOSPITAL COURSE
#Acute complicated diverticulitis with perforation and colonic abscess    CT scan uploaded to PACS. Official report shows abscess with phlegmonous reaction. Little drainable fluid. Measurement: 4.6x4.2x4.0. Several loculations with the most one measuring 3.7 cm  - Changed to ertapenem 1 g q24 hours and Flagyl for 3-4 weeks per ID depending on the repeat CT abdomen 3 weeks from now  - Midline to be placed on 3/2/2019. No need for PICC line, confirmed with Jennie from infusions. Can be discaharged after if antibiotics confirmed with follow up with Dr. Fischer on 1 week and F/u with Dr Davison [phone] at [address]  - Surgery Consult as requested by GI: No interventions. Outpatient follow up  - Discussed with IR on the phone: not drainable as abscess is intramural   - Regular diet with low fiber and residue. Tolerating. Per GI can continue   - Sent stool workup for chronic diarrhea

## 2019-02-28 NOTE — DISCHARGE NOTE ADULT - INSTRUCTIONS
Follow a soft diet for now, low in residue, rich in fiber until you follow up with Dr. Fischer in 1 week. If you are unable to tolerate that please return to a liquid diet

## 2019-02-28 NOTE — PROGRESS NOTE ADULT - ASSESSMENT
________________________________________________________________________________  DAILY PROGRESS NOTE:    ================== SUBJECTIVE ==================    TIA GORDON  /   63y  /  Male  /  MRN#: [id number]  Patient is a 63y old Male who presents with a chief complaint of Colonic Abscess (28 Feb 2019 13:05)  Currently admitted to medicine with the primary diagnosis of Diverticulitis of intestine with abscess      HOSPITAL DAY: 1d     OVERNIGHT EVENTS: None    TODAY: Patient was seen this morning at bedside. Currently, the patient reports some abdominal pain. Is tolerating diet.     Review of systems is otherwise negative.    =================== OBJECTIVE ===================    VITAL SIGNS: Last 24 Hours  T(C): 36.7 (28 Feb 2019 13:21), Max: 37.2 (28 Feb 2019 00:30)  T(F): 98.1 (28 Feb 2019 13:21), Max: 99 (28 Feb 2019 00:30)  HR: 71 (28 Feb 2019 13:21) (71 - 85)  BP: 133/72 (28 Feb 2019 13:21) (133/72 - 167/89)  BP(mean): --  RR: 18 (28 Feb 2019 13:21) (17 - 18)  SpO2: 96% (28 Feb 2019 10:07) (96% - 96%)    PHYSICAL EXAM:  GENERAL: NAD, well-developed  CHEST/LUNG: Clear to auscultation bilaterally; No wheeze  HEART: Regular rate and rhythm; No murmurs, rubs, or gallops  ABDOMEN: Soft. Nondistended; Bowel sounds present in all 4 quadrants. Hypoactive. Mild tenderness of LLQ  EXTREMITIES:  2+ Peripheral Pulses, No clubbing, cyanosis, or edema  NEUROLOGY: non-focal  General:  Appearance is consistent with chronologic age.  No abnormal facies.   General: AA&Ox3.  Fund of knowledge is intact and normal.  Language with normal repetition, comprehension   SKIN: No rashes or lesions      ===================== LABS =====================                        11.5   13.89 )-----------( 370      ( 28 Feb 2019 08:03 )             36.2     02-28    138  |  102  |  14  ----------------------------<  161<H>  4.3   |  24  |  0.9    Ca    8.9      28 Feb 2019 08:03  Mg     1.7     02-28    TPro  6.6  /  Alb  3.4<L>  /  TBili  0.4  /  DBili  x   /  AST  9   /  ALT  12  /  AlkPhos  89  02-28    PT/INR - ( 27 Feb 2019 19:22 )   PT: 13.30 sec;   INR: 1.16 ratio         PTT - ( 27 Feb 2019 19:22 )  PTT:27.4 sec    ================== IMAGING ==================        ================== ALLERGIES ===================  No Known Allergies    ==================== MEDS =====================  aspirin enteric coated 81 milliGRAM(s) Oral daily  chlorhexidine 4% Liquid 1 Application(s) Topical <User Schedule>  ciprofloxacin   IVPB 400 milliGRAM(s) IV Intermittent every 12 hours  dextrose 50% Injectable 25 Gram(s) IV Push once  docusate sodium 100 milliGRAM(s) Oral three times a day  enoxaparin Injectable 40 milliGRAM(s) SubCutaneous every 24 hours  insulin glargine Injectable (LANTUS) 12 Unit(s) SubCutaneous every morning  insulin lispro (HumaLOG) corrective regimen sliding scale   SubCutaneous three times a day before meals  insulin lispro Injectable (HumaLOG) 4 Unit(s) SubCutaneous three times a day before meals  lactobacillus acidophilus 1 Tablet(s) Oral daily  metoprolol tartrate 50 milliGRAM(s) Oral two times a day  metroNIDAZOLE  IVPB 500 milliGRAM(s) IV Intermittent every 8 hours  pantoprazole    Tablet 40 milliGRAM(s) Oral before breakfast  polyethylene glycol 3350 17 Gram(s) Oral two times a day  senna 2 Tablet(s) Oral at bedtime    PRN MEDICATIONS  acetaminophen   Tablet .. 650 milliGRAM(s) Oral every 6 hours PRN  morphine  - Injectable 2 milliGRAM(s) IV Push every 4 hours PRN      ================= ASSESS/PLAN ==================  Patient is a 63y old Male who presents with a chief complaint of Colonic Abscess (28 Feb 2019 13:05)  Currently admitted to medicine with the primary diagnosis of Diverticulitis of intestine with abscess      PLAN  63M w/pmhx of DM, HTN, Diverticulosis, Chronic Diarrhea presents from home for chronic diarrhea and abdominal pain. Found to have Colonic Abscess     #Colon Abscess and chronic diarrhea   CT scan uploaded to PACS. Spoke with radiology, awaiting read   - Continue IV Cipro and Flagyl  - Surgery Consult as requested by GI   - ID kenny for possible long term antibiotics   - Regular diet   - Sent stool workup for chronic diarrhea   - CT scan looks with stool impaction. Will add bowel regimen.     #HTN  - Changed Bysolic to metoprolol    #DM  - Basal bolus insulin  - keep FSG <180      GI PPX: Pantoprazole   DVT PPX: Lovenox     DIET: DASH   ACTIVITY:  () Ad Evelyn  /  (X) Advance as Tolerated  /  () Bed Rest  /  () Fall Precaution  /  () Seizure precaution    ================= PRESENT TODAY ==================    1-Payan Catheter: No  Indication:  2-Vascular Access:  [X]Peripheral | Indication: Antibiotics   []Midline | Indication:   []PICC Line | Indication:  []CVC | Indication:  []Arterial Line | Indication:  []Uldall Catheter | Indication:   3-IV Fluids: | Indication:  4-Ventilation: Room air | Sat: 98%    ================= DISPOSITION ==================    Patient to be discharged when condition(s) optimized.            Discharge to:  (X) Home  /  () SNF  /  () HHA /  () Hospice / () Other     ================= CODE STATUS =================                  (X) FULL CODE     |     () DNR     |     () DNI    () Discussion with patient and/or family regarding goals of care ________________________________________________________________________________  DAILY PROGRESS NOTE:    ================== SUBJECTIVE ==================    TIA GORDON  /   63y  /  Male  /  MRN#: [id number]  Patient is a 63y old Male who presents with a chief complaint of Colonic Abscess (28 Feb 2019 13:05)  Currently admitted to medicine with the primary diagnosis of Diverticulitis of intestine with abscess      HOSPITAL DAY: 1d     OVERNIGHT EVENTS: None    TODAY: Patient was seen this morning at bedside. Currently, the patient reports some abdominal pain. Is tolerating diet.     Review of systems is otherwise negative.    =================== OBJECTIVE ===================    VITAL SIGNS: Last 24 Hours  T(C): 36.7 (28 Feb 2019 13:21), Max: 37.2 (28 Feb 2019 00:30)  T(F): 98.1 (28 Feb 2019 13:21), Max: 99 (28 Feb 2019 00:30)  HR: 71 (28 Feb 2019 13:21) (71 - 85)  BP: 133/72 (28 Feb 2019 13:21) (133/72 - 167/89)  BP(mean): --  RR: 18 (28 Feb 2019 13:21) (17 - 18)  SpO2: 96% (28 Feb 2019 10:07) (96% - 96%)    PHYSICAL EXAM:  GENERAL: NAD, well-developed  CHEST/LUNG: Clear to auscultation bilaterally; No wheeze  HEART: Regular rate and rhythm; No murmurs, rubs, or gallops  ABDOMEN: Soft. Nondistended; Bowel sounds present in all 4 quadrants. Hypoactive. Mild tenderness of LLQ  EXTREMITIES:  2+ Peripheral Pulses, No clubbing, cyanosis, or edema  NEUROLOGY: non-focal  General:  Appearance is consistent with chronologic age.  No abnormal facies.   General: AA&Ox3.  Fund of knowledge is intact and normal.  Language with normal repetition, comprehension   SKIN: No rashes or lesions      ===================== LABS =====================                        11.5   13.89 )-----------( 370      ( 28 Feb 2019 08:03 )             36.2     02-28    138  |  102  |  14  ----------------------------<  161<H>  4.3   |  24  |  0.9    Ca    8.9      28 Feb 2019 08:03  Mg     1.7     02-28    TPro  6.6  /  Alb  3.4<L>  /  TBili  0.4  /  DBili  x   /  AST  9   /  ALT  12  /  AlkPhos  89  02-28    PT/INR - ( 27 Feb 2019 19:22 )   PT: 13.30 sec;   INR: 1.16 ratio         PTT - ( 27 Feb 2019 19:22 )  PTT:27.4 sec    ================== IMAGING ==================        ================== ALLERGIES ===================  No Known Allergies    ==================== MEDS =====================  aspirin enteric coated 81 milliGRAM(s) Oral daily  chlorhexidine 4% Liquid 1 Application(s) Topical <User Schedule>  ciprofloxacin   IVPB 400 milliGRAM(s) IV Intermittent every 12 hours  dextrose 50% Injectable 25 Gram(s) IV Push once  docusate sodium 100 milliGRAM(s) Oral three times a day  enoxaparin Injectable 40 milliGRAM(s) SubCutaneous every 24 hours  insulin glargine Injectable (LANTUS) 12 Unit(s) SubCutaneous every morning  insulin lispro (HumaLOG) corrective regimen sliding scale   SubCutaneous three times a day before meals  insulin lispro Injectable (HumaLOG) 4 Unit(s) SubCutaneous three times a day before meals  lactobacillus acidophilus 1 Tablet(s) Oral daily  metoprolol tartrate 50 milliGRAM(s) Oral two times a day  metroNIDAZOLE  IVPB 500 milliGRAM(s) IV Intermittent every 8 hours  pantoprazole    Tablet 40 milliGRAM(s) Oral before breakfast  polyethylene glycol 3350 17 Gram(s) Oral two times a day  senna 2 Tablet(s) Oral at bedtime    PRN MEDICATIONS  acetaminophen   Tablet .. 650 milliGRAM(s) Oral every 6 hours PRN  morphine  - Injectable 2 milliGRAM(s) IV Push every 4 hours PRN      ================= ASSESS/PLAN ==================  Patient is a 63y old Male who presents with a chief complaint of Colonic Abscess (28 Feb 2019 13:05)  Currently admitted to medicine with the primary diagnosis of Diverticulitis of intestine with abscess      PLAN  63M w/pmhx of DM, HTN, Diverticulosis, Chronic Diarrhea presents from home for chronic diarrhea and abdominal pain. Found to have Colonic Abscess     #Colon Abscess and chronic diarrhea   CT scan uploaded to PACS. Spoke with radiology, awaiting read   - Continue IV Cipro and Flagyl  - Surgery Consult as requested by GI   - ID kenny for possible long term antibiotics   - Regular diet   - Sent stool workup for chronic diarrhea   - CT scan looks with stool impaction. Will add bowel regimen.     #HTN  - Changed Bysolic to metoprolol    #DM  A1c is 8.9%   - Basal bolus insulin  - keep FSG <180      GI PPX: Pantoprazole   DVT PPX: Lovenox     DIET: DASH   ACTIVITY:  () Ad Evelyn  /  (X) Advance as Tolerated  /  () Bed Rest  /  () Fall Precaution  /  () Seizure precaution    ================= PRESENT TODAY ==================    1-Payan Catheter: No  Indication:  2-Vascular Access:  [X]Peripheral | Indication: Antibiotics   []Midline | Indication:   []PICC Line | Indication:  []CVC | Indication:  []Arterial Line | Indication:  []Uldall Catheter | Indication:   3-IV Fluids: | Indication:  4-Ventilation: Room air | Sat: 98%    ================= DISPOSITION ==================    Patient to be discharged when condition(s) optimized.            Discharge to:  (X) Home  /  () SNF  /  () HHA /  () Hospice / () Other     ================= CODE STATUS =================                  (X) FULL CODE     |     () DNR     |     () DNI    () Discussion with patient and/or family regarding goals of care

## 2019-03-01 LAB
ALBUMIN SERPL ELPH-MCNC: 3.6 G/DL — SIGNIFICANT CHANGE UP (ref 3.5–5.2)
ALP SERPL-CCNC: 90 U/L — SIGNIFICANT CHANGE UP (ref 30–115)
ALT FLD-CCNC: 12 U/L — SIGNIFICANT CHANGE UP (ref 0–41)
ANION GAP SERPL CALC-SCNC: 12 MMOL/L — SIGNIFICANT CHANGE UP (ref 7–14)
AST SERPL-CCNC: 10 U/L — SIGNIFICANT CHANGE UP (ref 0–41)
BASOPHILS # BLD AUTO: 0.04 K/UL — SIGNIFICANT CHANGE UP (ref 0–0.2)
BASOPHILS NFR BLD AUTO: 0.3 % — SIGNIFICANT CHANGE UP (ref 0–1)
BILIRUB SERPL-MCNC: 0.5 MG/DL — SIGNIFICANT CHANGE UP (ref 0.2–1.2)
BUN SERPL-MCNC: 16 MG/DL — SIGNIFICANT CHANGE UP (ref 10–20)
C DIFF BY PCR RESULT: NEGATIVE — SIGNIFICANT CHANGE UP
C DIFF TOX GENS STL QL NAA+PROBE: SIGNIFICANT CHANGE UP
CALCIUM SERPL-MCNC: 9.3 MG/DL — SIGNIFICANT CHANGE UP (ref 8.5–10.1)
CHLORIDE SERPL-SCNC: 102 MMOL/L — SIGNIFICANT CHANGE UP (ref 98–110)
CO2 SERPL-SCNC: 25 MMOL/L — SIGNIFICANT CHANGE UP (ref 17–32)
CREAT SERPL-MCNC: 0.8 MG/DL — SIGNIFICANT CHANGE UP (ref 0.7–1.5)
EOSINOPHIL # BLD AUTO: 0.35 K/UL — SIGNIFICANT CHANGE UP (ref 0–0.7)
EOSINOPHIL NFR BLD AUTO: 2.3 % — SIGNIFICANT CHANGE UP (ref 0–8)
GLUCOSE BLDC GLUCOMTR-MCNC: 138 MG/DL — HIGH (ref 70–99)
GLUCOSE BLDC GLUCOMTR-MCNC: 174 MG/DL — HIGH (ref 70–99)
GLUCOSE BLDC GLUCOMTR-MCNC: 253 MG/DL — HIGH (ref 70–99)
GLUCOSE BLDC GLUCOMTR-MCNC: 302 MG/DL — HIGH (ref 70–99)
GLUCOSE SERPL-MCNC: 147 MG/DL — HIGH (ref 70–99)
HCT VFR BLD CALC: 36.9 % — LOW (ref 42–52)
HCV AB S/CO SERPL IA: 0.39 S/CO — SIGNIFICANT CHANGE UP (ref 0–0.79)
HCV AB SERPL-IMP: SIGNIFICANT CHANGE UP
HGB BLD-MCNC: 11.8 G/DL — LOW (ref 14–18)
IMM GRANULOCYTES NFR BLD AUTO: 0.6 % — HIGH (ref 0.1–0.3)
LYMPHOCYTES # BLD AUTO: 1.09 K/UL — LOW (ref 1.2–3.4)
LYMPHOCYTES # BLD AUTO: 7 % — LOW (ref 20.5–51.1)
MCHC RBC-ENTMCNC: 26.9 PG — LOW (ref 27–31)
MCHC RBC-ENTMCNC: 32 G/DL — SIGNIFICANT CHANGE UP (ref 32–37)
MCV RBC AUTO: 84.1 FL — SIGNIFICANT CHANGE UP (ref 80–94)
MONOCYTES # BLD AUTO: 1.14 K/UL — HIGH (ref 0.1–0.6)
MONOCYTES NFR BLD AUTO: 7.3 % — SIGNIFICANT CHANGE UP (ref 1.7–9.3)
NEUTROPHILS # BLD AUTO: 12.8 K/UL — HIGH (ref 1.4–6.5)
NEUTROPHILS NFR BLD AUTO: 82.5 % — HIGH (ref 42.2–75.2)
NRBC # BLD: 0 /100 WBCS — SIGNIFICANT CHANGE UP (ref 0–0)
PLATELET # BLD AUTO: 359 K/UL — SIGNIFICANT CHANGE UP (ref 130–400)
POTASSIUM SERPL-MCNC: 4.9 MMOL/L — SIGNIFICANT CHANGE UP (ref 3.5–5)
POTASSIUM SERPL-SCNC: 4.9 MMOL/L — SIGNIFICANT CHANGE UP (ref 3.5–5)
PROT SERPL-MCNC: 6.9 G/DL — SIGNIFICANT CHANGE UP (ref 6–8)
RBC # BLD: 4.39 M/UL — LOW (ref 4.7–6.1)
RBC # FLD: 14.8 % — HIGH (ref 11.5–14.5)
SODIUM SERPL-SCNC: 139 MMOL/L — SIGNIFICANT CHANGE UP (ref 135–146)
WBC # BLD: 15.52 K/UL — HIGH (ref 4.8–10.8)
WBC # FLD AUTO: 15.52 K/UL — HIGH (ref 4.8–10.8)

## 2019-03-01 PROCEDURE — 99222 1ST HOSP IP/OBS MODERATE 55: CPT

## 2019-03-01 RX ORDER — ERTAPENEM SODIUM 1 G/1
1 INJECTION, POWDER, LYOPHILIZED, FOR SOLUTION INTRAMUSCULAR; INTRAVENOUS
Qty: 28 | Refills: 0 | OUTPATIENT
Start: 2019-03-01 | End: 2019-03-28

## 2019-03-01 RX ORDER — ERTAPENEM SODIUM 1 G/1
1000 INJECTION, POWDER, LYOPHILIZED, FOR SOLUTION INTRAMUSCULAR; INTRAVENOUS EVERY 24 HOURS
Qty: 0 | Refills: 0 | Status: DISCONTINUED | OUTPATIENT
Start: 2019-03-01 | End: 2019-03-02

## 2019-03-01 RX ORDER — METRONIDAZOLE 500 MG
1 TABLET ORAL
Qty: 84 | Refills: 0 | OUTPATIENT
Start: 2019-03-01 | End: 2019-03-28

## 2019-03-01 RX ORDER — PANTOPRAZOLE SODIUM 20 MG/1
1 TABLET, DELAYED RELEASE ORAL
Qty: 30 | Refills: 0 | OUTPATIENT
Start: 2019-03-01 | End: 2019-03-30

## 2019-03-01 RX ORDER — MICAFUNGIN SODIUM 100 MG/1
INJECTION, POWDER, LYOPHILIZED, FOR SOLUTION INTRAVENOUS
Qty: 0 | Refills: 0 | Status: DISCONTINUED | OUTPATIENT
Start: 2019-03-01 | End: 2019-03-01

## 2019-03-01 RX ORDER — LACTOBACILLUS ACIDOPHILUS 100MM CELL
1 CAPSULE ORAL
Qty: 30 | Refills: 0 | OUTPATIENT
Start: 2019-03-01 | End: 2019-03-30

## 2019-03-01 RX ADMIN — MORPHINE SULFATE 2 MILLIGRAM(S): 50 CAPSULE, EXTENDED RELEASE ORAL at 05:25

## 2019-03-01 RX ADMIN — Medication 1 TABLET(S): at 12:09

## 2019-03-01 RX ADMIN — ERTAPENEM SODIUM 120 MILLIGRAM(S): 1 INJECTION, POWDER, LYOPHILIZED, FOR SOLUTION INTRAMUSCULAR; INTRAVENOUS at 17:27

## 2019-03-01 RX ADMIN — Medication 100 MILLIGRAM(S): at 14:04

## 2019-03-01 RX ADMIN — Medication 650 MILLIGRAM(S): at 14:21

## 2019-03-01 RX ADMIN — Medication 200 MILLIGRAM(S): at 06:35

## 2019-03-01 RX ADMIN — Medication 50 MILLIGRAM(S): at 05:25

## 2019-03-01 RX ADMIN — MORPHINE SULFATE 2 MILLIGRAM(S): 50 CAPSULE, EXTENDED RELEASE ORAL at 17:28

## 2019-03-01 RX ADMIN — Medication 4 UNIT(S): at 08:16

## 2019-03-01 RX ADMIN — INSULIN GLARGINE 12 UNIT(S): 100 INJECTION, SOLUTION SUBCUTANEOUS at 12:18

## 2019-03-01 RX ADMIN — Medication 100 MILLIGRAM(S): at 05:26

## 2019-03-01 RX ADMIN — Medication 4 UNIT(S): at 17:29

## 2019-03-01 RX ADMIN — POLYETHYLENE GLYCOL 3350 17 GRAM(S): 17 POWDER, FOR SOLUTION ORAL at 05:26

## 2019-03-01 RX ADMIN — Medication 100 MILLIGRAM(S): at 05:25

## 2019-03-01 RX ADMIN — Medication 100 MILLIGRAM(S): at 21:33

## 2019-03-01 RX ADMIN — Medication 100 MILLIGRAM(S): at 14:05

## 2019-03-01 RX ADMIN — POLYETHYLENE GLYCOL 3350 17 GRAM(S): 17 POWDER, FOR SOLUTION ORAL at 17:28

## 2019-03-01 RX ADMIN — Medication 50 MILLIGRAM(S): at 17:29

## 2019-03-01 RX ADMIN — PANTOPRAZOLE SODIUM 40 MILLIGRAM(S): 20 TABLET, DELAYED RELEASE ORAL at 05:27

## 2019-03-01 RX ADMIN — Medication 4 UNIT(S): at 12:09

## 2019-03-01 RX ADMIN — MORPHINE SULFATE 2 MILLIGRAM(S): 50 CAPSULE, EXTENDED RELEASE ORAL at 06:35

## 2019-03-01 RX ADMIN — Medication 2: at 12:09

## 2019-03-01 RX ADMIN — MORPHINE SULFATE 2 MILLIGRAM(S): 50 CAPSULE, EXTENDED RELEASE ORAL at 22:42

## 2019-03-01 RX ADMIN — Medication 81 MILLIGRAM(S): at 12:09

## 2019-03-01 RX ADMIN — Medication 8: at 17:29

## 2019-03-01 RX ADMIN — MORPHINE SULFATE 2 MILLIGRAM(S): 50 CAPSULE, EXTENDED RELEASE ORAL at 23:30

## 2019-03-01 RX ADMIN — SENNA PLUS 2 TABLET(S): 8.6 TABLET ORAL at 21:33

## 2019-03-01 RX ADMIN — MORPHINE SULFATE 2 MILLIGRAM(S): 50 CAPSULE, EXTENDED RELEASE ORAL at 12:10

## 2019-03-01 NOTE — CONSULT NOTE ADULT - ASSESSMENT
Patient is a 62 y/o with PMHx of DM, HTN, Diverticulosis, that developed abdominal pain over the last two months. Patient was seen by Dr. Paredes as outpatient and Colonoscopy was done. Per patient he was told that colonoscopy wasn't revealing of much. He had persistent abdominal pain and weight loss so he was started on cipro and flagyl by pmd . He still wasn't having any improvement so he presented to Dr. Fischer for a second opinion. CT scan was done  and revealing of larry-colonic abscess    IMPRESSION:   diverticulitis with perforation with abscess with peritoneal signs    RECOMMENDATIONS:  Ertapenem 1 gm iv q24h  Po Flagyl 500 mg q8h  Duration 3-4 weeks depending on the repeat CT abdomen 3 weeks from now  F/u with Dr Davison [phone] at [address] [facility]
Patient is a 64 y/o with PMHx of DM, HTN, Diverticulosis, that developed abdominal pain over the last two months. Patient was seen by Dr. Paredes as outpatient and Colonoscopy was done. Per patient he was told that colonoscopy wasn't revealing of much. He had persistent abdominal pain and weight loss so he was started on cipro and flagyl by pmd . He still wasn't having any improvement so he presented to Dr. Fischer for a second opinion. CT scan was done  and revealing of larry-colonic abscess. Patient currently feels well. Patient will likely need a long course of IV ABX.     Colonic abscess  - ID consult, may need PICC for continuation of treatment  - Blood Cultures  - CT scan with us, will upload to PACS today  - Surgery consult although likely no intervention  - Will follow

## 2019-03-01 NOTE — PROGRESS NOTE ADULT - ASSESSMENT
Patient is a 64 y/o with PMHx of DM, HTN, Diverticulosis, that developed abdominal pain over the last two months. Patient was seen by Dr. Paredes as outpatient and Colonoscopy was done. Per patient he was told that colonoscopy wasn't revealing of much. He had persistent abdominal pain and weight loss so he was started on cipro and flagyl by pmd . He still wasn't having any improvement so he presented to Dr. Fischer for a second opinion. CT scan was done  and revealing of larry-colonic abscess. Patient currently feels well. Seen by ID and [place]-rectal surgery.     Colonic abscess  - ID consult appreciated awaiting mid line  - [place] Rectal consult appreciated  - Agree with low fiber, low residue diet

## 2019-03-01 NOTE — PROGRESS NOTE ADULT - ASSESSMENT
Patient was seen and examined. Spoke with RN. Chart reviewed.    No events overnight.  Vital Signs Last 24 Hrs  T(F): 99.3 (01 Mar 2019 14:11), Max: 99.3 (01 Mar 2019 14:11)  HR: 82 (01 Mar 2019 14:11) (72 - 85)  BP: 134/79 (01 Mar 2019 14:11) (128/71 - 153/85)  SpO2: 96% (01 Mar 2019 08:22) (96% - 96%)  MEDICATIONS  (STANDING):  aspirin enteric coated 81 milliGRAM(s) Oral daily  chlorhexidine 4% Liquid 1 Application(s) Topical <User Schedule>  ciprofloxacin   IVPB 400 milliGRAM(s) IV Intermittent every 12 hours  dextrose 50% Injectable 25 Gram(s) IV Push once  docusate sodium 100 milliGRAM(s) Oral three times a day  enoxaparin Injectable 40 milliGRAM(s) SubCutaneous every 24 hours  insulin glargine Injectable (LANTUS) 12 Unit(s) SubCutaneous every morning  insulin lispro (HumaLOG) corrective regimen sliding scale   SubCutaneous three times a day before meals  insulin lispro Injectable (HumaLOG) 4 Unit(s) SubCutaneous three times a day before meals  lactobacillus acidophilus 1 Tablet(s) Oral daily  metoprolol tartrate 50 milliGRAM(s) Oral two times a day  metroNIDAZOLE  IVPB 500 milliGRAM(s) IV Intermittent every 8 hours  pantoprazole    Tablet 40 milliGRAM(s) Oral before breakfast  polyethylene glycol 3350 17 Gram(s) Oral two times a day  senna 2 Tablet(s) Oral at bedtime    MEDICATIONS  (PRN):  acetaminophen   Tablet .. 650 milliGRAM(s) Oral every 6 hours PRN Mild Pain (1 - 3)  morphine  - Injectable 2 milliGRAM(s) IV Push every 4 hours PRN Severe Pain (7 - 10)    Labs:                        11.8   15.52 )-----------( 359      ( 01 Mar 2019 10:11 )             36.9                         11.5   13.89 )-----------( 370      ( 28 Feb 2019 08:03 )             36.2     01 Mar 2019 10:11    139    |  102    |  16     ----------------------------<  147    4.9     |  25     |  0.8    28 Feb 2019 08:03    138    |  102    |  14     ----------------------------<  161    4.3     |  24     |  0.9      Ca    9.3        01 Mar 2019 10:11  Ca    8.9        28 Feb 2019 08:03  Mg     1.7       28 Feb 2019 08:03    TPro  6.9    /  Alb  3.6    /  TBili  0.5    /  DBili  x      /  AST  10     /  ALT  12     /  AlkPhos  90     01 Mar 2019 10:11  TPro  6.6    /  Alb  3.4    /  TBili  0.4    /  DBili  x      /  AST  9      /  ALT  12     /  AlkPhos  89     28 Feb 2019 08:03    PT/INR - ( 27 Feb 2019 19:22 )   PT: 13.30 sec;   INR: 1.16 ratio         PTT - ( 27 Feb 2019 19:22 )  PTT:27.4 sec      Culture - Blood (collected 28 Feb 2019 08:03)  Source: .Blood None  Preliminary Report (01 Mar 2019 14:00):    No growth to date.      M w/pmhx of DM, HTN, Diverticulosis, Chronic Diarrhea presents from home for chronic diarrhea and abdominal pain. Found to have Colonic Abscess     #Colon Abscess and chronic diarrhea   CT scan uploaded to PACS. Spoke with radiology, awaiting read   - Continue IV Cipro and Flagyl  - Surgery Consult noted   gi fu,   id fu for picc line?  - ID eval for possible long term antibiotics   - Regular diet   - Sent stool workup for chronic diarrhea   - CT scan looks with stool impaction. Will add bowel regimen.     #HTN  - Changed Bysolic to metoprolol    #DM  A1c is 8.9%   - Basal bolus insulin

## 2019-03-01 NOTE — PROGRESS NOTE ADULT - ASSESSMENT
________________________________________________________________________________  DAILY PROGRESS NOTE:    ================== SUBJECTIVE ==================    TIA GORDON  /   63y  /  Male  /  MRN#: [id number]  Patient is a 63y old Male who presents with a chief complaint of Colonic Abscess (01 Mar 2019 17:10)  Currently admitted to medicine with the primary diagnosis of Diverticulitis of intestine with abscess      HOSPITAL DAY: 2d     OVERNIGHT EVENTS: None    TODAY: Patient was seen this morning at bedside. Currently, the patient reports no complaints. Tolerating diet     Review of systems is otherwise negative.    =================== OBJECTIVE ===================    VITAL SIGNS: Last 24 Hours  T(C): 37.4 (01 Mar 2019 14:11), Max: 37.4 (01 Mar 2019 14:11)  T(F): 99.3 (01 Mar 2019 14:11), Max: 99.3 (01 Mar 2019 14:11)  HR: 82 (01 Mar 2019 14:11) (72 - 85)  BP: 134/79 (01 Mar 2019 14:11) (128/71 - 153/85)  BP(mean): --  RR: 17 (01 Mar 2019 14:11) (17 - 18)  SpO2: 96% (01 Mar 2019 08:22) (96% - 96%)    02-28-19 @ 07:01  -  03-01-19 @ 07:00  --------------------------------------------------------  IN: 0 mL / OUT: 1100 mL / NET: -1100 mL      PHYSICAL EXAM:  GENERAL: NAD, well-developed  ABDOMEN: Soft, Nontender, Nondistended; Bowel sounds present  EXTREMITIES:  2+ Peripheral Pulses, No clubbing, cyanosis, or edema  PSYCH: AAOx3  NEUROLOGY: non-focal  General:  Appearance is consistent with chronologic age.  No abnormal facies.   General: AA&Ox3.  Fund of knowledge is intact and normal.  Language with normal repetition, comprehension and naming.  Nondysarthric.    SKIN: No rashes or lesions      ===================== LABS =====================                        11.8   15.52 )-----------( 359      ( 01 Mar 2019 10:11 )             36.9     03-01    139  |  102  |  16  ----------------------------<  147<H>  4.9   |  25  |  0.8    Ca    9.3      01 Mar 2019 10:11  Mg     1.7     02-28    TPro  6.9  /  Alb  3.6  /  TBili  0.5  /  DBili  x   /  AST  10  /  ALT  12  /  AlkPhos  90  03-01    PT/INR - ( 27 Feb 2019 19:22 )   PT: 13.30 sec;   INR: 1.16 ratio         PTT - ( 27 Feb 2019 19:22 )  PTT:27.4 sec    ================= MICROBIOLOGY ================    Culture - Blood (collected 28 Feb 2019 08:03)  Source: .Blood None  Preliminary Report (01 Mar 2019 14:00):    No growth to date.    ================== ALLERGIES ===================  No Known Allergies    ==================== MEDS =====================  aspirin enteric coated 81 milliGRAM(s) Oral daily  chlorhexidine 4% Liquid 1 Application(s) Topical <User Schedule>  dextrose 50% Injectable 25 Gram(s) IV Push once  docusate sodium 100 milliGRAM(s) Oral three times a day  enoxaparin Injectable 40 milliGRAM(s) SubCutaneous every 24 hours  ertapenem  IVPB 1000 milliGRAM(s) IV Intermittent every 24 hours  insulin glargine Injectable (LANTUS) 12 Unit(s) SubCutaneous every morning  insulin lispro (HumaLOG) corrective regimen sliding scale   SubCutaneous three times a day before meals  insulin lispro Injectable (HumaLOG) 4 Unit(s) SubCutaneous three times a day before meals  lactobacillus acidophilus 1 Tablet(s) Oral daily  metoprolol tartrate 50 milliGRAM(s) Oral two times a day  metroNIDAZOLE  IVPB 500 milliGRAM(s) IV Intermittent every 8 hours  pantoprazole    Tablet 40 milliGRAM(s) Oral before breakfast  polyethylene glycol 3350 17 Gram(s) Oral two times a day  senna 2 Tablet(s) Oral at bedtime    PRN MEDICATIONS  acetaminophen   Tablet .. 650 milliGRAM(s) Oral every 6 hours PRN  morphine  - Injectable 2 milliGRAM(s) IV Push every 4 hours PRN      ================= ASSESS/PLAN ==================  Patient is a 63y old Male who presents with a chief complaint of Colonic Abscess (28 Feb 2019 13:05)  Currently admitted to medicine with the primary diagnosis of Diverticulitis of intestine with abscess      PLAN  63M w/pmhx of DM, HTN, Diverticulosis, Chronic Diarrhea presents from home for chronic diarrhea and abdominal pain. Found to have Colonic Abscess     #Acute complicated diverticulitis with perforation and colonic abscess    CT scan uploaded to PACS. Official report shows abscess with phlegmonous reaction. Little drainable fluid. Measurement: 4.6x4.2x4.0. Several loculations with the most one measuring 3.7 cm  - Changed to ertapenem 1 g q24 hours and Flagyl for 3-4 weeks per ID depending on the repeat CT abdomen 3 weeks from now  . Midline to be placed on 3/2/2019. No need for PICC line, confirmed with Jennie from infusions. Can be discaharged after if antibiotics confirmed with follow up with Dr. Fischer on 1 week and F/u with Dr Davison [phone] at [address]  - Surgery Consult as requested by GI: No interventions.   - Discussed with IR on the phone: not drainable as abscess is intramural   - Regular diet with low fiber and residue. Tolerating. Per GI can continue   - Sent stool workup for chronic diarrhea     #HTN  - Changed Bysolic to metoprolol    #DM  A1c is 8.9%   - Basal bolus insulin  - keep FSG <180      GI PPX: Pantoprazole   DVT PPX: Lovenox     DIET: DASH   ACTIVITY:  () Ad Evelyn  /  (X) Advance as Tolerated  /  () Bed Rest  /  () Fall Precaution  /  () Seizure precaution    ================= PRESENT TODAY ==================    1-Payan Catheter: No  Indication:  2-Vascular Access:  [X]Peripheral | Indication: Antibiotics   []Midline | Indication:   []PICC Line | Indication:  []CVC | Indication:  []Arterial Line | Indication:  []Uldall Catheter | Indication:   3-IV Fluids: | Indication:  4-Ventilation: Room air | Sat: 98%    ================= DISPOSITION ==================    Patient to be discharged when condition(s) optimized.            Discharge to:  (X) Home  /  () SNF  /  () [facility]HA /  () Hospice / () Other     ================= CODE STATUS =================                  (X) FULL CODE     |     () DNR     |     () DNI

## 2019-03-01 NOTE — PROGRESS NOTE ADULT - SUBJECTIVE AND OBJECTIVE BOX
Patient is a 64 y/o with PMHx of DM, HTN, Diverticulosis, that developed abdominal pain over the last two months. Patient was found on outpatient imaging to have larry-colonic abscess. Patient feels better today seen by ID and [place] rectal surgery. Awaiting Mid line placement. No overnight events.      Vital Signs:  T(F): 99.3  HR: 82  BP: 134/79  RR: 17  SpO2: 96%  Physical Exam  Gen: NAD  HEENT: NC/AT  Cardio: S1/S2 No S3/S4, Regular  Resp: CTA B/L  Abdomen: Soft, ND/NT  Neuro: AAOx3  Extremities: FROM x 4                                     11.8   15.52 )-----------( 359      ( 01 Mar 2019 10:11 )             36.9   03-01    139  |  102  |  16  ----------------------------<  147<H>  4.9   |  25  |  0.8    Ca    9.3      01 Mar 2019 10:11  Mg     1.7     02-28    TPro  6.9  /  Alb  3.6  /  TBili  0.5  /  DBili  x   /  AST  10  /  ALT  12  /  AlkPhos  90  03-01

## 2019-03-01 NOTE — CONSULT NOTE ADULT - SUBJECTIVE AND OBJECTIVE BOX
TIA GORDON  63y, Male  Allergy: No Known Allergies      HPI:  63M w/pmhx of DM, HTN, Diverticulosis, Chronic Diarrhea presents from home for chronic diarrhea and abdominal pain. Patient states the diarrhea has been persistent for the last two months, non-bloody, and associated with a 26 lbs weight loss.  Patient had a C-Scope ~3weeks ago which he reported as normal. Diarrhea persisted and he was referred to Dr. Weiss who did a CT a/p as out patient which revealed a larry-colonic abscess too small to drain. He had him sent in for IV abx and surgical consultation.   Patient denied fever, chills, recent sick contacts, travel, n/v, history of colitis, aggravating or alleviating factors of diarrhea, CP, SOB, dizziness, LOC. Patient has been maintaining PO intake. No family history of GI issues or cancers     In Ed: HR: 79    T:98.3     BP: 159/75     RR: 18, 98% on RA   Given Morphine, Fluids at 150/hr, Cipro and Flagyl (27 Feb 2019 23:17)    FAMILY HISTORY:  Family history of diabetes mellitus (Father)    PAST MEDICAL & SURGICAL HISTORY:  Diverticulosis  Diarrhea  Diabetes  Hypertension  H/O hernia repair        VITALS:  T(F): 99.3, Max: 99.3 (03-01-19 @ 14:11)  HR: 82  BP: 134/79  RR: 17Vital Signs Last 24 Hrs  T(C): 37.4 (01 Mar 2019 14:11), Max: 37.4 (01 Mar 2019 14:11)  T(F): 99.3 (01 Mar 2019 14:11), Max: 99.3 (01 Mar 2019 14:11)  HR: 82 (01 Mar 2019 14:11) (72 - 85)  BP: 134/79 (01 Mar 2019 14:11) (128/71 - 153/85)  BP(mean): --  RR: 17 (01 Mar 2019 14:11) (17 - 18)  SpO2: 96% (01 Mar 2019 08:22) (96% - 96%)    TESTS & MEASUREMENTS:                        11.8   15.52 )-----------( 359      ( 01 Mar 2019 10:11 )             36.9     03-01    139  |  102  |  16  ----------------------------<  147<H>  4.9   |  25  |  0.8    Ca    9.3      01 Mar 2019 10:11  Mg     1.7     02-28    TPro  6.9  /  Alb  3.6  /  TBili  0.5  /  DBili  x   /  AST  10  /  ALT  12  /  AlkPhos  90  03-01    LIVER FUNCTIONS - ( 01 Mar 2019 10:11 )  Alb: 3.6 g/dL / Pro: 6.9 g/dL / ALK PHOS: 90 U/L / ALT: 12 U/L / AST: 10 U/L / GGT: x             Culture - Blood (collected 02-28-19 @ 08:03)  Source: .Blood None  Preliminary Report (03-01-19 @ 14:00):    No growth to date.            RADIOLOGY & ADDITIONAL TESTS:    ANTIBIOTICS:  ciprofloxacin   IVPB 400 milliGRAM(s) IV Intermittent every 12 hours  metroNIDAZOLE  IVPB 500 milliGRAM(s) IV Intermittent every 8 hours

## 2019-03-01 NOTE — PROGRESS NOTE ADULT - SUBJECTIVE AND OBJECTIVE BOX
Patient was seen and examined. Spoke with RN. Chart reviewed.  anthony po well, case discuused with family at bedside,    No events overnight.  Vital Signs Last 24 Hrs  T(F): 99.3 (01 Mar 2019 14:11), Max: 99.3 (01 Mar 2019 14:11)  HR: 82 (01 Mar 2019 14:11) (72 - 85)  BP: 134/79 (01 Mar 2019 14:11) (128/71 - 153/85)  SpO2: 96% (01 Mar 2019 08:22) (96% - 96%)  MEDICATIONS  (STANDING):  aspirin enteric coated 81 milliGRAM(s) Oral daily  chlorhexidine 4% Liquid 1 Application(s) Topical <User Schedule>  ciprofloxacin   IVPB 400 milliGRAM(s) IV Intermittent every 12 hours  dextrose 50% Injectable 25 Gram(s) IV Push once  docusate sodium 100 milliGRAM(s) Oral three times a day  enoxaparin Injectable 40 milliGRAM(s) SubCutaneous every 24 hours  insulin glargine Injectable (LANTUS) 12 Unit(s) SubCutaneous every morning  insulin lispro (HumaLOG) corrective regimen sliding scale   SubCutaneous three times a day before meals  insulin lispro Injectable (HumaLOG) 4 Unit(s) SubCutaneous three times a day before meals  lactobacillus acidophilus 1 Tablet(s) Oral daily  metoprolol tartrate 50 milliGRAM(s) Oral two times a day  metroNIDAZOLE  IVPB 500 milliGRAM(s) IV Intermittent every 8 hours  pantoprazole    Tablet 40 milliGRAM(s) Oral before breakfast  polyethylene glycol 3350 17 Gram(s) Oral two times a day  senna 2 Tablet(s) Oral at bedtime    MEDICATIONS  (PRN):  acetaminophen   Tablet .. 650 milliGRAM(s) Oral every 6 hours PRN Mild Pain (1 - 3)  morphine  - Injectable 2 milliGRAM(s) IV Push every 4 hours PRN Severe Pain (7 - 10)    Labs:                        11.8   15.52 )-----------( 359      ( 01 Mar 2019 10:11 )             36.9                         11.5   13.89 )-----------( 370      ( 28 Feb 2019 08:03 )             36.2     01 Mar 2019 10:11    139    |  102    |  16     ----------------------------<  147    4.9     |  25     |  0.8    28 Feb 2019 08:03    138    |  102    |  14     ----------------------------<  161    4.3     |  24     |  0.9      Ca    9.3        01 Mar 2019 10:11  Ca    8.9        28 Feb 2019 08:03  Mg     1.7       28 Feb 2019 08:03    TPro  6.9    /  Alb  3.6    /  TBili  0.5    /  DBili  x      /  AST  10     /  ALT  12     /  AlkPhos  90     01 Mar 2019 10:11  TPro  6.6    /  Alb  3.4    /  TBili  0.4    /  DBili  x      /  AST  9      /  ALT  12     /  AlkPhos  89     28 Feb 2019 08:03    PT/INR - ( 27 Feb 2019 19:22 )   PT: 13.30 sec;   INR: 1.16 ratio         PTT - ( 27 Feb 2019 19:22 )  PTT:27.4 sec      Culture - Blood (collected 28 Feb 2019 08:03)  Source: .Blood None  Preliminary Report (01 Mar 2019 14:00):    No growth to date.        Radiology:    General: comfortable, NAD  Neurology: A&Ox3, nonfocal  Head:  Normocephalic, atraumatic  ENT:  Mucosa moist, no ulcerations  Neck:  Supple, no JVD,   Skin: no breakdowns (as per RN)  Resp: CTA B/L  CV: RRR, S1S2,   GI: Soft, NT, bowel sounds  MS: No edema, + peripheral pulses, FROM all 4 extremity

## 2019-03-02 VITALS
DIASTOLIC BLOOD PRESSURE: 75 MMHG | HEART RATE: 87 BPM | SYSTOLIC BLOOD PRESSURE: 146 MMHG | RESPIRATION RATE: 18 BRPM | TEMPERATURE: 98 F

## 2019-03-02 LAB
ANION GAP SERPL CALC-SCNC: 14 MMOL/L — SIGNIFICANT CHANGE UP (ref 7–14)
BASOPHILS # BLD AUTO: 0.05 K/UL — SIGNIFICANT CHANGE UP (ref 0–0.2)
BASOPHILS NFR BLD AUTO: 0.3 % — SIGNIFICANT CHANGE UP (ref 0–1)
BUN SERPL-MCNC: 16 MG/DL — SIGNIFICANT CHANGE UP (ref 10–20)
CALCIUM SERPL-MCNC: 9 MG/DL — SIGNIFICANT CHANGE UP (ref 8.5–10.1)
CHLORIDE SERPL-SCNC: 102 MMOL/L — SIGNIFICANT CHANGE UP (ref 98–110)
CO2 SERPL-SCNC: 22 MMOL/L — SIGNIFICANT CHANGE UP (ref 17–32)
CREAT SERPL-MCNC: 0.9 MG/DL — SIGNIFICANT CHANGE UP (ref 0.7–1.5)
EOSINOPHIL # BLD AUTO: 0.34 K/UL — SIGNIFICANT CHANGE UP (ref 0–0.7)
EOSINOPHIL NFR BLD AUTO: 2.2 % — SIGNIFICANT CHANGE UP (ref 0–8)
GLUCOSE BLDC GLUCOMTR-MCNC: 167 MG/DL — HIGH (ref 70–99)
GLUCOSE BLDC GLUCOMTR-MCNC: 215 MG/DL — HIGH (ref 70–99)
GLUCOSE BLDC GLUCOMTR-MCNC: 225 MG/DL — HIGH (ref 70–99)
GLUCOSE SERPL-MCNC: 247 MG/DL — HIGH (ref 70–99)
HCT VFR BLD CALC: 37 % — LOW (ref 42–52)
HGB BLD-MCNC: 11.5 G/DL — LOW (ref 14–18)
IMM GRANULOCYTES NFR BLD AUTO: 0.7 % — HIGH (ref 0.1–0.3)
LYMPHOCYTES # BLD AUTO: 0.91 K/UL — LOW (ref 1.2–3.4)
LYMPHOCYTES # BLD AUTO: 6 % — LOW (ref 20.5–51.1)
MCHC RBC-ENTMCNC: 26.6 PG — LOW (ref 27–31)
MCHC RBC-ENTMCNC: 31.1 G/DL — LOW (ref 32–37)
MCV RBC AUTO: 85.5 FL — SIGNIFICANT CHANGE UP (ref 80–94)
MONOCYTES # BLD AUTO: 1.01 K/UL — HIGH (ref 0.1–0.6)
MONOCYTES NFR BLD AUTO: 6.6 % — SIGNIFICANT CHANGE UP (ref 1.7–9.3)
NEUTROPHILS # BLD AUTO: 12.83 K/UL — HIGH (ref 1.4–6.5)
NEUTROPHILS NFR BLD AUTO: 84.2 % — HIGH (ref 42.2–75.2)
NRBC # BLD: 0 /100 WBCS — SIGNIFICANT CHANGE UP (ref 0–0)
PLATELET # BLD AUTO: 354 K/UL — SIGNIFICANT CHANGE UP (ref 130–400)
POTASSIUM SERPL-MCNC: 4.2 MMOL/L — SIGNIFICANT CHANGE UP (ref 3.5–5)
POTASSIUM SERPL-SCNC: 4.2 MMOL/L — SIGNIFICANT CHANGE UP (ref 3.5–5)
RBC # BLD: 4.33 M/UL — LOW (ref 4.7–6.1)
RBC # FLD: 14.8 % — HIGH (ref 11.5–14.5)
SODIUM SERPL-SCNC: 138 MMOL/L — SIGNIFICANT CHANGE UP (ref 135–146)
WBC # BLD: 15.25 K/UL — HIGH (ref 4.8–10.8)
WBC # FLD AUTO: 15.25 K/UL — HIGH (ref 4.8–10.8)

## 2019-03-02 RX ADMIN — POLYETHYLENE GLYCOL 3350 17 GRAM(S): 17 POWDER, FOR SOLUTION ORAL at 06:03

## 2019-03-02 RX ADMIN — Medication 100 MILLIGRAM(S): at 06:02

## 2019-03-02 RX ADMIN — Medication 4: at 13:18

## 2019-03-02 RX ADMIN — INSULIN GLARGINE 12 UNIT(S): 100 INJECTION, SOLUTION SUBCUTANEOUS at 08:46

## 2019-03-02 RX ADMIN — MORPHINE SULFATE 2 MILLIGRAM(S): 50 CAPSULE, EXTENDED RELEASE ORAL at 15:14

## 2019-03-02 RX ADMIN — Medication 100 MILLIGRAM(S): at 15:14

## 2019-03-02 RX ADMIN — MORPHINE SULFATE 2 MILLIGRAM(S): 50 CAPSULE, EXTENDED RELEASE ORAL at 18:52

## 2019-03-02 RX ADMIN — PANTOPRAZOLE SODIUM 40 MILLIGRAM(S): 20 TABLET, DELAYED RELEASE ORAL at 06:03

## 2019-03-02 RX ADMIN — Medication 2: at 08:45

## 2019-03-02 RX ADMIN — Medication 4 UNIT(S): at 08:44

## 2019-03-02 RX ADMIN — Medication 4 UNIT(S): at 13:18

## 2019-03-02 RX ADMIN — Medication 4: at 18:55

## 2019-03-02 RX ADMIN — ERTAPENEM SODIUM 120 MILLIGRAM(S): 1 INJECTION, POWDER, LYOPHILIZED, FOR SOLUTION INTRAMUSCULAR; INTRAVENOUS at 17:28

## 2019-03-02 RX ADMIN — Medication 50 MILLIGRAM(S): at 06:03

## 2019-03-02 RX ADMIN — Medication 100 MILLIGRAM(S): at 06:03

## 2019-03-02 RX ADMIN — Medication 100 MILLIGRAM(S): at 13:20

## 2019-03-02 RX ADMIN — Medication 50 MILLIGRAM(S): at 18:57

## 2019-03-02 RX ADMIN — MORPHINE SULFATE 2 MILLIGRAM(S): 50 CAPSULE, EXTENDED RELEASE ORAL at 08:49

## 2019-03-02 RX ADMIN — Medication 4 UNIT(S): at 18:54

## 2019-03-02 RX ADMIN — Medication 81 MILLIGRAM(S): at 11:33

## 2019-03-02 RX ADMIN — Medication 1 TABLET(S): at 11:33

## 2019-03-02 NOTE — PROGRESS NOTE ADULT - ASSESSMENT
63M w/pmhx of DM, HTN, Diverticulosis, Chronic Diarrhea presents from home for chronic diarrhea and abdominal pain. Found to have Colonic Abscess     #Acute complicated diverticulitis with perforation and colonic abscess    CT scan uploaded to PACS. Official report shows abscess with phlegmonous reaction. Little drainable fluid. Measurement: 4.6x4.2x4.0. Several loculations with the most one measuring 3.7 cm  - Changed to ertapenem 1 g q24 hours and Flagyl for 3-4 weeks per ID depending on the repeat CT abdomen 3 weeks from now  . Midline to be placed on 3/2/2019. No need for PICC line, confirmed with Jennie from infusions. Can be discaharged after if antibiotics confirmed with follow up with Dr. Fischer on 1 week and F/u with Dr Davison [phone] at [address]  - Surgery Consult as requested by GI: No interventions.   - Discussed with IR on the phone: not drainable as abscess is intramural   - Regular diet with low fiber and residue. Tolerating. Per GI can continue   - Sent stool workup for chronic diarrhea     #HTN  - Changed Bysolic to metoprolol    #DM  A1c is 8.9%   - Basal bolus insulin  - keep FSG <180      GI PPX: Pantoprazole

## 2019-03-02 NOTE — PROCEDURE NOTE - PROCEDURE
<<-----Click on this checkbox to enter Procedure Midline catheter insertion  03/02/2019    Active  [id number]

## 2019-03-02 NOTE — PROGRESS NOTE ADULT - SUBJECTIVE AND OBJECTIVE BOX
Progress Note: General Surgery  Patient: TIA GORDON , 63y (10-Reynaldo-1955)Male   MRN: [id number]  Location: [address]  Visit: 02-27-19 Inpatient  Date: 03-02-19 @ 02:21    Procedure/Diagnosis: complicated diverticulitis w/ abscess    Events/ 24h: No acute events overnight. Pain controlled.    Vitals: T(F): 97 (03-01-19 @ 20:42), Max: 99.3 (03-01-19 @ 14:11)  HR: 85 (03-01-19 @ 20:42)  BP: 126/70 (03-01-19 @ 20:42) (126/70 - 153/85)  RR: 17 (03-01-19 @ 14:11)  SpO2: 96% (03-01-19 @ 08:22)    In:   02-28-19 @ 07:01  -  03-01-19 @ 07:00  --------------------------------------------------------  IN: 0 mL      Out:   02-28-19 @ 07:01  -  03-01-19 @ 07:00  --------------------------------------------------------  OUT:    Voided: 1100 mL  Total OUT: 1100 mL        Net:   02-28-19 @ 07:01  -  03-01-19 @ 07:00  --------------------------------------------------------  NET: -1100 mL        Diet: Diet, Soft:   High Fiber  DASH/TLC Sodium & Cholesterol Restricted  No Lactose (03-01-19 @ 18:05)    IV Fluids:     Physical Examination:  General Appearance: NAD  HEENT: EOMI, sclera non-icteric.  Heart: RRR   Lungs: CTABL.   Abdomen:  Soft, nontender, nondistended.   MSK/Extremities: Warm & well-perfused. Peripheral pulses intact.  Skin: Warm, dry. No jaundice.       Medications: [Standing]  aspirin enteric coated 81 milliGRAM(s) Oral daily  chlorhexidine 4% Liquid 1 Application(s) Topical <User Schedule>  dextrose 50% Injectable 25 Gram(s) IV Push once  docusate sodium 100 milliGRAM(s) Oral three times a day  enoxaparin Injectable 40 milliGRAM(s) SubCutaneous every 24 hours  ertapenem  IVPB 1000 milliGRAM(s) IV Intermittent every 24 hours  insulin glargine Injectable (LANTUS) 12 Unit(s) SubCutaneous every morning  insulin lispro (HumaLOG) corrective regimen sliding scale   SubCutaneous three times a day before meals  insulin lispro Injectable (HumaLOG) 4 Unit(s) SubCutaneous three times a day before meals  lactobacillus acidophilus 1 Tablet(s) Oral daily  metoprolol tartrate 50 milliGRAM(s) Oral two times a day  metroNIDAZOLE  IVPB 500 milliGRAM(s) IV Intermittent every 8 hours  pantoprazole    Tablet 40 milliGRAM(s) Oral before breakfast  polyethylene glycol 3350 17 Gram(s) Oral two times a day  senna 2 Tablet(s) Oral at bedtime    DVT Prophylaxis: enoxaparin Injectable 40 milliGRAM(s) SubCutaneous every 24 hours    GI Prophylaxis: pantoprazole    Tablet 40 milliGRAM(s) Oral before breakfast    Antibiotics: ertapenem  IVPB 1000 milliGRAM(s) IV Intermittent every 24 hours  metroNIDAZOLE  IVPB 500 milliGRAM(s) IV Intermittent every 8 hours    Anticoagulation:   Medications:[PRN]  acetaminophen   Tablet .. 650 milliGRAM(s) Oral every 6 hours PRN  morphine  - Injectable 2 milliGRAM(s) IV Push every 4 hours PRN      Labs:                        11.8   15.52 )-----------( 359      ( 01 Mar 2019 10:11 )             36.9     03-01    139  |  102  |  16  ----------------------------<  147<H>  4.9   |  25  |  0.8    Ca    9.3      01 Mar 2019 10:11  Mg     1.7     02-28    TPro  6.9  /  Alb  3.6  /  TBili  0.5  /  DBili  x   /  AST  10  /  ALT  12  /  AlkPhos  90  03-01    LIVER FUNCTIONS - ( 01 Mar 2019 10:11 )  Alb: 3.6 g/dL / Pro: 6.9 g/dL / ALK PHOS: 90 U/L / ALT: 12 U/L / AST: 10 U/L / GGT: x               Urine/Micro:    Culture - Blood (collected 28 Feb 2019 08:03)  Source: .Blood None  Preliminary Report (01 Mar 2019 14:00):    No growth to date.      Assessment:  63y Male patient admitted w/ complicated diverticulitis w/ abscess    Plan:    Pt currently on high fiber full liquid diet  Surgery team recommending clears, low fiber  DVT/GI ppx  OOBAT  IS  Pain control    Date/Time: 03-02-19 @ 02:22

## 2019-03-02 NOTE — PROGRESS NOTE ADULT - SUBJECTIVE AND OBJECTIVE BOX
Patient was seen and examined. Spoke with RN. Chart reviewed.    No events overnight.  Vital Signs Last 24 Hrs  T(F): 99.4 (02 Mar 2019 05:03), Max: 99.4 (02 Mar 2019 05:03)  HR: 92 (02 Mar 2019 05:03) (82 - 92)  BP: 169/88 (02 Mar 2019 05:03) (126/70 - 169/88)  SpO2: --  MEDICATIONS  (STANDING):  aspirin enteric coated 81 milliGRAM(s) Oral daily  chlorhexidine 4% Liquid 1 Application(s) Topical <User Schedule>  dextrose 50% Injectable 25 Gram(s) IV Push once  docusate sodium 100 milliGRAM(s) Oral three times a day  enoxaparin Injectable 40 milliGRAM(s) SubCutaneous every 24 hours  ertapenem  IVPB 1000 milliGRAM(s) IV Intermittent every 24 hours  insulin glargine Injectable (LANTUS) 12 Unit(s) SubCutaneous every morning  insulin lispro (HumaLOG) corrective regimen sliding scale   SubCutaneous three times a day before meals  insulin lispro Injectable (HumaLOG) 4 Unit(s) SubCutaneous three times a day before meals  lactobacillus acidophilus 1 Tablet(s) Oral daily  metoprolol tartrate 50 milliGRAM(s) Oral two times a day  metroNIDAZOLE  IVPB 500 milliGRAM(s) IV Intermittent every 8 hours  pantoprazole    Tablet 40 milliGRAM(s) Oral before breakfast  polyethylene glycol 3350 17 Gram(s) Oral two times a day  senna 2 Tablet(s) Oral at bedtime    MEDICATIONS  (PRN):  acetaminophen   Tablet .. 650 milliGRAM(s) Oral every 6 hours PRN Mild Pain (1 - 3)  morphine  - Injectable 2 milliGRAM(s) IV Push every 4 hours PRN Severe Pain (7 - 10)    Labs:                        11.5   15.25 )-----------( 354      ( 02 Mar 2019 10:10 )             37.0                         11.8   15.52 )-----------( 359      ( 01 Mar 2019 10:11 )             36.9     02 Mar 2019 10:10    138    |  102    |  16     ----------------------------<  247    4.2     |  22     |  0.9    01 Mar 2019 10:11    139    |  102    |  16     ----------------------------<  147    4.9     |  25     |  0.8      Ca    9.0        02 Mar 2019 10:10  Ca    9.3        01 Mar 2019 10:11    TPro  6.9    /  Alb  3.6    /  TBili  0.5    /  DBili  x      /  AST  10     /  ALT  12     /  AlkPhos  90     01 Mar 2019 10:11          Culture - Blood (collected 28 Feb 2019 08:03)  Source: .Blood None  Preliminary Report (01 Mar 2019 14:00):    No growth to date.        Radiology:    General: comfortable, NAD  Neurology: A&Ox3, nonfocal  Head:  Normocephalic, atraumatic  ENT:  Mucosa moist, no ulcerations  Neck:  Supple, no JVD,   Skin: no breakdown  Resp: CTA B/L  CV: RRR, S1S2,   GI: Soft, NT, bowel sounds  MS: No edema, + peripheral pulses, FROM all 4 extremity

## 2019-03-03 LAB
CULTURE RESULTS: SIGNIFICANT CHANGE UP
SPECIMEN SOURCE: SIGNIFICANT CHANGE UP

## 2019-03-05 LAB
CULTURE RESULTS: SIGNIFICANT CHANGE UP
CULTURE RESULTS: SIGNIFICANT CHANGE UP
SPECIMEN SOURCE: SIGNIFICANT CHANGE UP
SPECIMEN SOURCE: SIGNIFICANT CHANGE UP

## 2019-03-06 LAB
FAT STL QN: NORMAL — SIGNIFICANT CHANGE UP
FAT STL QN: NORMAL — SIGNIFICANT CHANGE UP

## 2019-03-07 DIAGNOSIS — K57.20 DIVERTICULITIS OF LARGE INTESTINE WITH PERFORATION AND ABSCESS WITHOUT BLEEDING: ICD-10-CM

## 2019-03-07 DIAGNOSIS — E11.9 TYPE 2 DIABETES MELLITUS WITHOUT COMPLICATIONS: ICD-10-CM

## 2019-03-07 DIAGNOSIS — K52.9 NONINFECTIVE GASTROENTERITIS AND COLITIS, UNSPECIFIED: ICD-10-CM

## 2019-03-07 DIAGNOSIS — I10 ESSENTIAL (PRIMARY) HYPERTENSION: ICD-10-CM

## 2019-03-08 LAB — ELASTASE PANC STL-MCNT: >500 — SIGNIFICANT CHANGE UP

## 2019-03-12 PROBLEM — Z00.00 ENCOUNTER FOR PREVENTIVE HEALTH EXAMINATION: Status: ACTIVE | Noted: 2019-03-12

## 2019-03-12 LAB — LACTOFERRIN STL-MCNC: 211.8 — HIGH (ref 0–7.24)

## 2019-03-14 PROBLEM — E11.9 TYPE 2 DIABETES MELLITUS WITHOUT COMPLICATIONS: Chronic | Status: ACTIVE | Noted: 2019-02-27

## 2019-03-14 PROBLEM — R19.7 DIARRHEA, UNSPECIFIED: Chronic | Status: ACTIVE | Noted: 2019-02-27

## 2019-03-14 PROBLEM — K57.90 DIVERTICULOSIS OF INTESTINE, PART UNSPECIFIED, WITHOUT PERFORATION OR ABSCESS WITHOUT BLEEDING: Chronic | Status: ACTIVE | Noted: 2019-02-27

## 2019-03-14 PROBLEM — I10 ESSENTIAL (PRIMARY) HYPERTENSION: Chronic | Status: ACTIVE | Noted: 2019-02-27

## 2019-03-15 ENCOUNTER — APPOINTMENT (OUTPATIENT)
Dept: COLORECTAL SURGERY | Facility: CLINIC | Age: 64
End: 2019-03-15
Payer: COMMERCIAL

## 2019-03-15 VITALS
SYSTOLIC BLOOD PRESSURE: 131 MMHG | HEART RATE: 80 BPM | WEIGHT: 207 LBS | TEMPERATURE: 98.7 F | DIASTOLIC BLOOD PRESSURE: 75 MMHG | HEIGHT: 68 IN | BODY MASS INDEX: 31.37 KG/M2

## 2019-03-15 PROCEDURE — 99203 OFFICE O/P NEW LOW 30 MIN: CPT

## 2019-03-15 NOTE — PHYSICAL EXAM
[Abdomen Masses] : No abdominal masses [JVD] : no jugular venous distention  [Normal Breath Sounds] : Normal breath sounds [No Rash or Lesion] : No rash or lesion [de-identified] : diastasis of rectus muscle

## 2019-03-15 NOTE — HISTORY OF PRESENT ILLNESS
[FreeTextEntry1] : 62 y/o M presents for evaluation of of complicated diverticulitis\par Colonoscopy done 1/31/19 with Dr. Paredes, completed for h/o diarrhea and mucous\par - second degree hemorrhoids\par - diverticulosis of the small intestine w/o perforation or abscess, biopsied\par Previous colonoscopy done ~ 13 years ago, no abnormal findings per patient\par CT A/P done 2/27/19 regional Radiology when symptoms persisted\par - presence of multiloculated abscess located in the central pelvis, abscess source is the adjacent mid sigmoid colon and diverticulitis, abscess predominantely phlegmonous tissue and very little drainable fluid\par - small bilateral lower pole renal calculi\par - widened lower esophagous\par Sent to ED following results of CT, admitted from 2/27/19 to 3/2/19\par D/c'd with PICC line, to completed 3 week course of oral Flagyl and 3 weeks Ertapenem (1G), has completed ~ 13 days worth\par 3/2/19, WBC~ 15, last week VNS collected blood, WBC climbed to 20. Repeated this week, result from 3/14/19 12.82 \par Has not followed up with ID since d/c, looking for new ID as they are not satisfied with care \par Had lost 30 lbs in less than 2 months, has gained back 9 lbs in the past several weeks\par Reports soreness with occasional low mid abdominal that radiates to the back, spontaneous begins and resolves\par Denies fever or chills\par Eating 2 small meals daily, following a low residue diet\par BH: 10-12 times daily, loose "apple sauce" consistency \par Mucous drainage has subsided since the beginning of month\par Denies FMH of GI disorders or GI cancer

## 2019-03-15 NOTE — ASSESSMENT
[FreeTextEntry1] : I have reviewed with the patient the findings of his CAT scan and recent lab work. I suggested that we proceed with a repeat CT scan to reassess this diverticular associated abscess. I have outlined to him that if the abscess has matured there may be potential role for intervention radiology drainage. However, if the absence has enlarged or persists and there is no options for drainage a sigmoid resection and colostomy creation would be most appropriate. The associated risk of attempting surgery will including a sigmoid resection and primary anastomosis in this setting would be fraught with a very significant risk of anastomotic leak. I therefore detailed to him that likely necessity of temporary fecal diversion i.e. colostomy/ileostomy creation.

## 2019-03-19 ENCOUNTER — OUTPATIENT (OUTPATIENT)
Dept: OUTPATIENT SERVICES | Facility: HOSPITAL | Age: 64
LOS: 1 days | Discharge: HOME | End: 2019-03-19

## 2019-03-19 DIAGNOSIS — R10.2 PELVIC AND PERINEAL PAIN: ICD-10-CM

## 2019-03-19 DIAGNOSIS — K57.32 DIVERTICULITIS OF LARGE INTESTINE WITHOUT PERFORATION OR ABSCESS WITHOUT BLEEDING: ICD-10-CM

## 2019-03-19 DIAGNOSIS — Z98.890 OTHER SPECIFIED POSTPROCEDURAL STATES: Chronic | ICD-10-CM

## 2019-03-19 DIAGNOSIS — R10.9 UNSPECIFIED ABDOMINAL PAIN: ICD-10-CM

## 2019-03-21 RX ORDER — METRONIDAZOLE 500 MG/1
500 TABLET ORAL 3 TIMES DAILY
Qty: 36 | Refills: 0 | Status: ACTIVE | COMMUNITY
Start: 2019-03-21 | End: 1900-01-01

## 2019-03-25 ENCOUNTER — APPOINTMENT (OUTPATIENT)
Dept: COLORECTAL SURGERY | Facility: CLINIC | Age: 64
End: 2019-03-25
Payer: COMMERCIAL

## 2019-03-25 VITALS
TEMPERATURE: 98.1 F | HEIGHT: 68 IN | HEART RATE: 75 BPM | SYSTOLIC BLOOD PRESSURE: 151 MMHG | BODY MASS INDEX: 31.37 KG/M2 | DIASTOLIC BLOOD PRESSURE: 78 MMHG | WEIGHT: 207 LBS

## 2019-03-25 PROCEDURE — 99215 OFFICE O/P EST HI 40 MIN: CPT

## 2019-03-25 NOTE — ASSESSMENT
[FreeTextEntry1] : I had extensive discussion with the patient (45 minutes) regarding the diagnosis and treatment options. I recommended that he consider proceeding with a robotic assisted sigmoid colon resection, possible open procedure, possible diverting ileostomy, possible Sukumar procedure.\par \par The associated risks, benefits, alternatives of the procedure have been outlined discussed and reviewed with the patient's family. These risks including but not limited to bleeding, infection, anastomotic leak, need for secondary surgery, need for ileostomy or colostomy creation, change in bowel habits, , adjacent organ or bowel injury, as well as the risk of heart and lung complications infection and death were detailed. The patient understands these risks and consents the planned procedure. Appropriate  literature regarding surgery and post operative treatment/complications and enhanced recovery pathway has been detailed and reviewed. Consent was obtained. All questions were answered.\par

## 2019-03-25 NOTE — HISTORY OF PRESENT ILLNESS
[FreeTextEntry1] : 62 y/o M presents for f/u diverticulitis\par Recent CT scan shows no change in abscess, WBC continues to trend down\par Continuing with IV and oral antibiotics daily\par Continued abdominal discomfort\par Denies fever, chills, N, V\par BH: 10-15 times daily, diarrhea persists

## 2019-03-25 NOTE — PHYSICAL EXAM
[Abdomen Masses] : No abdominal masses [JVD] : no jugular venous distention  [Normal Breath Sounds] : Normal breath sounds [No Rash or Lesion] : No rash or lesion [de-identified] : diastasis of rectus muscle

## 2019-04-02 ENCOUNTER — CHART COPY (OUTPATIENT)
Age: 64
End: 2019-04-02

## 2019-04-03 ENCOUNTER — INPATIENT (INPATIENT)
Facility: HOSPITAL | Age: 64
LOS: 5 days | Discharge: HOME HEALTH | End: 2019-04-09
Payer: COMMERCIAL

## 2019-04-03 ENCOUNTER — RESULT REVIEW (OUTPATIENT)
Age: 64
End: 2019-04-03

## 2019-04-03 VITALS
HEIGHT: 68 IN | RESPIRATION RATE: 18 BRPM | WEIGHT: 195.11 LBS | HEART RATE: 73 BPM | TEMPERATURE: 98 F | DIASTOLIC BLOOD PRESSURE: 95 MMHG | SYSTOLIC BLOOD PRESSURE: 136 MMHG

## 2019-04-03 DIAGNOSIS — Z98.890 OTHER SPECIFIED POSTPROCEDURAL STATES: Chronic | ICD-10-CM

## 2019-04-03 PROCEDURE — 88304 TISSUE EXAM BY PATHOLOGIST: CPT | Mod: 26

## 2019-04-03 PROCEDURE — 88307 TISSUE EXAM BY PATHOLOGIST: CPT | Mod: 26

## 2019-04-03 RX ORDER — ACETAMINOPHEN 500 MG
1000 TABLET ORAL ONCE
Qty: 0 | Refills: 0 | Status: COMPLETED | OUTPATIENT
Start: 2019-04-03 | End: 2019-04-03

## 2019-04-03 RX ORDER — SODIUM CHLORIDE 9 MG/ML
250 INJECTION, SOLUTION INTRAVENOUS ONCE
Qty: 0 | Refills: 0 | Status: COMPLETED | OUTPATIENT
Start: 2019-04-03 | End: 2019-04-03

## 2019-04-03 RX ORDER — GLUCAGON INJECTION, SOLUTION 0.5 MG/.1ML
1 INJECTION, SOLUTION SUBCUTANEOUS ONCE
Qty: 0 | Refills: 0 | Status: DISCONTINUED | OUTPATIENT
Start: 2019-04-03 | End: 2019-04-09

## 2019-04-03 RX ORDER — SODIUM CHLORIDE 9 MG/ML
1000 INJECTION, SOLUTION INTRAVENOUS
Qty: 0 | Refills: 0 | Status: DISCONTINUED | OUTPATIENT
Start: 2019-04-03 | End: 2019-04-09

## 2019-04-03 RX ORDER — DEXTROSE 50 % IN WATER 50 %
25 SYRINGE (ML) INTRAVENOUS ONCE
Qty: 0 | Refills: 0 | Status: DISCONTINUED | OUTPATIENT
Start: 2019-04-03 | End: 2019-04-09

## 2019-04-03 RX ORDER — MEPERIDINE HYDROCHLORIDE 50 MG/ML
12.5 INJECTION INTRAMUSCULAR; INTRAVENOUS; SUBCUTANEOUS
Qty: 0 | Refills: 0 | Status: DISCONTINUED | OUTPATIENT
Start: 2019-04-03 | End: 2019-04-04

## 2019-04-03 RX ORDER — METOPROLOL TARTRATE 50 MG
2.5 TABLET ORAL EVERY 6 HOURS
Qty: 0 | Refills: 0 | Status: COMPLETED | OUTPATIENT
Start: 2019-04-03 | End: 2019-04-04

## 2019-04-03 RX ORDER — CEFOTETAN DISODIUM 1 G
2 VIAL (EA) INJECTION EVERY 12 HOURS
Qty: 0 | Refills: 0 | Status: COMPLETED | OUTPATIENT
Start: 2019-04-03 | End: 2019-04-04

## 2019-04-03 RX ORDER — ONDANSETRON 8 MG/1
4 TABLET, FILM COATED ORAL EVERY 6 HOURS
Qty: 0 | Refills: 0 | Status: DISCONTINUED | OUTPATIENT
Start: 2019-04-03 | End: 2019-04-09

## 2019-04-03 RX ORDER — ONDANSETRON 8 MG/1
4 TABLET, FILM COATED ORAL ONCE
Qty: 0 | Refills: 0 | Status: DISCONTINUED | OUTPATIENT
Start: 2019-04-03 | End: 2019-04-04

## 2019-04-03 RX ORDER — INSULIN LISPRO 100/ML
VIAL (ML) SUBCUTANEOUS
Qty: 0 | Refills: 0 | Status: DISCONTINUED | OUTPATIENT
Start: 2019-04-03 | End: 2019-04-09

## 2019-04-03 RX ORDER — NALOXONE HYDROCHLORIDE 4 MG/.1ML
0.1 SPRAY NASAL
Qty: 0 | Refills: 0 | Status: DISCONTINUED | OUTPATIENT
Start: 2019-04-03 | End: 2019-04-09

## 2019-04-03 RX ORDER — SODIUM CHLORIDE 9 MG/ML
1000 INJECTION, SOLUTION INTRAVENOUS
Qty: 0 | Refills: 0 | Status: DISCONTINUED | OUTPATIENT
Start: 2019-04-03 | End: 2019-04-04

## 2019-04-03 RX ORDER — MORPHINE SULFATE 50 MG/1
4 CAPSULE, EXTENDED RELEASE ORAL
Qty: 0 | Refills: 0 | Status: DISCONTINUED | OUTPATIENT
Start: 2019-04-03 | End: 2019-04-03

## 2019-04-03 RX ORDER — GABAPENTIN 400 MG/1
600 CAPSULE ORAL ONCE
Qty: 0 | Refills: 0 | Status: COMPLETED | OUTPATIENT
Start: 2019-04-03 | End: 2019-04-03

## 2019-04-03 RX ORDER — DEXTROSE 50 % IN WATER 50 %
12.5 SYRINGE (ML) INTRAVENOUS ONCE
Qty: 0 | Refills: 0 | Status: DISCONTINUED | OUTPATIENT
Start: 2019-04-03 | End: 2019-04-09

## 2019-04-03 RX ORDER — MORPHINE SULFATE 50 MG/1
30 CAPSULE, EXTENDED RELEASE ORAL
Qty: 0 | Refills: 0 | Status: DISCONTINUED | OUTPATIENT
Start: 2019-04-03 | End: 2019-04-06

## 2019-04-03 RX ORDER — MORPHINE SULFATE 50 MG/1
2 CAPSULE, EXTENDED RELEASE ORAL
Qty: 0 | Refills: 0 | Status: DISCONTINUED | OUTPATIENT
Start: 2019-04-03 | End: 2019-04-04

## 2019-04-03 RX ORDER — HEPARIN SODIUM 5000 [USP'U]/ML
5000 INJECTION INTRAVENOUS; SUBCUTANEOUS ONCE
Qty: 0 | Refills: 0 | Status: COMPLETED | OUTPATIENT
Start: 2019-04-03 | End: 2019-04-03

## 2019-04-03 RX ORDER — DEXTROSE 50 % IN WATER 50 %
15 SYRINGE (ML) INTRAVENOUS ONCE
Qty: 0 | Refills: 0 | Status: DISCONTINUED | OUTPATIENT
Start: 2019-04-03 | End: 2019-04-09

## 2019-04-03 RX ADMIN — MORPHINE SULFATE 4 MILLIGRAM(S): 50 CAPSULE, EXTENDED RELEASE ORAL at 21:11

## 2019-04-03 RX ADMIN — MORPHINE SULFATE 4 MILLIGRAM(S): 50 CAPSULE, EXTENDED RELEASE ORAL at 20:54

## 2019-04-03 RX ADMIN — MORPHINE SULFATE 4 MILLIGRAM(S): 50 CAPSULE, EXTENDED RELEASE ORAL at 21:08

## 2019-04-03 RX ADMIN — Medication 1: at 20:40

## 2019-04-03 RX ADMIN — MORPHINE SULFATE 4 MILLIGRAM(S): 50 CAPSULE, EXTENDED RELEASE ORAL at 20:42

## 2019-04-03 RX ADMIN — Medication 100 GRAM(S): at 22:41

## 2019-04-03 RX ADMIN — SODIUM CHLORIDE 500 MILLILITER(S): 9 INJECTION, SOLUTION INTRAVENOUS at 22:15

## 2019-04-03 RX ADMIN — MORPHINE SULFATE 4 MILLIGRAM(S): 50 CAPSULE, EXTENDED RELEASE ORAL at 20:39

## 2019-04-03 RX ADMIN — MORPHINE SULFATE 4 MILLIGRAM(S): 50 CAPSULE, EXTENDED RELEASE ORAL at 21:26

## 2019-04-03 RX ADMIN — Medication 1000 MILLIGRAM(S): at 15:07

## 2019-04-03 RX ADMIN — MORPHINE SULFATE 30 MILLILITER(S): 50 CAPSULE, EXTENDED RELEASE ORAL at 21:30

## 2019-04-03 RX ADMIN — SODIUM CHLORIDE 120 MILLILITER(S): 9 INJECTION, SOLUTION INTRAVENOUS at 19:45

## 2019-04-03 RX ADMIN — HEPARIN SODIUM 5000 UNIT(S): 5000 INJECTION INTRAVENOUS; SUBCUTANEOUS at 15:08

## 2019-04-03 RX ADMIN — GABAPENTIN 600 MILLIGRAM(S): 400 CAPSULE ORAL at 15:07

## 2019-04-03 RX ADMIN — MORPHINE SULFATE 4 MILLIGRAM(S): 50 CAPSULE, EXTENDED RELEASE ORAL at 20:27

## 2019-04-03 RX ADMIN — SODIUM CHLORIDE 125 MILLILITER(S): 9 INJECTION, SOLUTION INTRAVENOUS at 23:25

## 2019-04-03 RX ADMIN — MORPHINE SULFATE 4 MILLIGRAM(S): 50 CAPSULE, EXTENDED RELEASE ORAL at 20:53

## 2019-04-03 NOTE — CHART NOTE - NSCHARTNOTEFT_GEN_A_CORE
PACU ANESTHESIA ADMISSION NOTE      Procedure: Colectomy, left or sigmoid, laparoscopic    Post op diagnosis:  Abscess of sigmoid colon due to diverticulitis  Sigmoid diverticulitis      ____  Intubated  TV:______       Rate: ______      FiO2: ______    __x__  Patent Airway    __x__  Full return of protective reflexes    ____  Full recovery from anesthesia / back to baseline     Vitals:   T:  98F         R: 12                 BP: 137/76                 Sat: 96                  P: 87      Mental Status:  _x___ Awake   _____ Alert   _____ Drowsy   _____ Sedated    Nausea/Vomiting:  _x___ NO  ______Yes,   See Post - Op Orders          Pain Scale (0-10):  __0___    Treatment: ____ None    ____ See Post - Op/PCA Orders    Post - Operative Fluids:   ____ Oral   __x__ See Post - Op Orders    Plan: Discharge:   ____Home       _x____Floor     _____Critical Care    _____  Other:_________________    Comments: Pt awake, VS stable, no anesthesia complications.

## 2019-04-03 NOTE — PRE-ANESTHESIA EVALUATION ADULT - NSANTHOSAYNRD_GEN_A_CORE
No. SANDRITA screening performed.  STOP BANG Legend: 0-2 = LOW Risk; 3-4 = INTERMEDIATE Risk; 5-8 = HIGH Risk

## 2019-04-03 NOTE — PRE-ANESTHESIA EVALUATION ADULT - NSANTHPMHFT_GEN_ALL_CORE
hernia repair s/p mesh 2 years ago    diverticultis/ abscess, treated with Ertapenem x 6 weeks via midline (not in place any longer)  not febrile in preop

## 2019-04-03 NOTE — BRIEF OPERATIVE NOTE - NSICDXBRIEFPOSTOP_GEN_ALL_CORE_FT
POST-OP DIAGNOSIS:  Abscess of sigmoid colon due to diverticulitis 03-Apr-2019 19:26:16  Epifanio Cavazos  Sigmoid diverticulitis 03-Apr-2019 19:26:04  Epifanio Cavazos

## 2019-04-04 LAB
ANION GAP SERPL CALC-SCNC: 14 MMOL/L — SIGNIFICANT CHANGE UP (ref 7–14)
BUN SERPL-MCNC: 12 MG/DL — SIGNIFICANT CHANGE UP (ref 10–20)
CALCIUM SERPL-MCNC: 8.7 MG/DL — SIGNIFICANT CHANGE UP (ref 8.5–10.1)
CHLORIDE SERPL-SCNC: 100 MMOL/L — SIGNIFICANT CHANGE UP (ref 98–110)
CO2 SERPL-SCNC: 22 MMOL/L — SIGNIFICANT CHANGE UP (ref 17–32)
CREAT SERPL-MCNC: 0.9 MG/DL — SIGNIFICANT CHANGE UP (ref 0.7–1.5)
GLUCOSE SERPL-MCNC: 156 MG/DL — HIGH (ref 70–99)
HCT VFR BLD CALC: 38.7 % — LOW (ref 42–52)
HGB BLD-MCNC: 12.5 G/DL — LOW (ref 14–18)
MCHC RBC-ENTMCNC: 27.9 PG — SIGNIFICANT CHANGE UP (ref 27–31)
MCHC RBC-ENTMCNC: 32.3 G/DL — SIGNIFICANT CHANGE UP (ref 32–37)
MCV RBC AUTO: 86.4 FL — SIGNIFICANT CHANGE UP (ref 80–94)
NRBC # BLD: 0 /100 WBCS — SIGNIFICANT CHANGE UP (ref 0–0)
PLATELET # BLD AUTO: 345 K/UL — SIGNIFICANT CHANGE UP (ref 130–400)
POTASSIUM SERPL-MCNC: 4.8 MMOL/L — SIGNIFICANT CHANGE UP (ref 3.5–5)
POTASSIUM SERPL-SCNC: 4.8 MMOL/L — SIGNIFICANT CHANGE UP (ref 3.5–5)
RBC # BLD: 4.48 M/UL — LOW (ref 4.7–6.1)
RBC # FLD: 17 % — HIGH (ref 11.5–14.5)
SODIUM SERPL-SCNC: 136 MMOL/L — SIGNIFICANT CHANGE UP (ref 135–146)
WBC # BLD: 18.78 K/UL — HIGH (ref 4.8–10.8)
WBC # FLD AUTO: 18.78 K/UL — HIGH (ref 4.8–10.8)

## 2019-04-04 RX ORDER — TAMSULOSIN HYDROCHLORIDE 0.4 MG/1
0.4 CAPSULE ORAL AT BEDTIME
Qty: 0 | Refills: 0 | Status: DISCONTINUED | OUTPATIENT
Start: 2019-04-04 | End: 2019-04-09

## 2019-04-04 RX ORDER — SODIUM CHLORIDE 9 MG/ML
1000 INJECTION, SOLUTION INTRAVENOUS
Qty: 0 | Refills: 0 | Status: DISCONTINUED | OUTPATIENT
Start: 2019-04-04 | End: 2019-04-07

## 2019-04-04 RX ORDER — PANTOPRAZOLE SODIUM 20 MG/1
40 TABLET, DELAYED RELEASE ORAL
Qty: 0 | Refills: 0 | Status: DISCONTINUED | OUTPATIENT
Start: 2019-04-04 | End: 2019-04-05

## 2019-04-04 RX ORDER — ACETAMINOPHEN 500 MG
650 TABLET ORAL EVERY 6 HOURS
Qty: 0 | Refills: 0 | Status: DISCONTINUED | OUTPATIENT
Start: 2019-04-04 | End: 2019-04-09

## 2019-04-04 RX ADMIN — Medication 2.5 MILLIGRAM(S): at 11:34

## 2019-04-04 RX ADMIN — Medication 2.5 MILLIGRAM(S): at 06:01

## 2019-04-04 RX ADMIN — SODIUM CHLORIDE 100 MILLILITER(S): 9 INJECTION, SOLUTION INTRAVENOUS at 22:40

## 2019-04-04 RX ADMIN — PANTOPRAZOLE SODIUM 40 MILLIGRAM(S): 20 TABLET, DELAYED RELEASE ORAL at 19:22

## 2019-04-04 RX ADMIN — Medication 2: at 17:57

## 2019-04-04 RX ADMIN — Medication 650 MILLIGRAM(S): at 18:20

## 2019-04-04 RX ADMIN — TAMSULOSIN HYDROCHLORIDE 0.4 MILLIGRAM(S): 0.4 CAPSULE ORAL at 19:22

## 2019-04-04 RX ADMIN — Medication 2.5 MILLIGRAM(S): at 16:42

## 2019-04-04 RX ADMIN — Medication 100 GRAM(S): at 05:59

## 2019-04-04 NOTE — PROGRESS NOTE ADULT - ASSESSMENT
Assessment:  63y Male patient admitted  S/P LAPAROSCOPIC ASSISTED SIGMOID COLECTOMY AND DIVERTING ILEOSTOMY      Plan:  - await return of bowel function    - Clears today  - pain control, may attempt switch to PO meds if tolerating diet   - DVT & GI PPX  - Encourage Ambulation and IS

## 2019-04-04 NOTE — ADVANCED PRACTICE NURSE CONSULT - RECOMMEDATIONS
Review of Ostomy care   literature and supplies provided  Wife and patient receptive to education and appropriate.  2 3/4" wafer and bag at bedside.  Binder in place.  Aware of available resources.

## 2019-04-04 NOTE — PATIENT PROFILE ADULT - NSPROMEDSBROUGHTTOHOSP_GEN_A_NUR
Continue to fortify EBM with neosure powder for 22kcal/oz  Feeding 42mL q3hrs via PO/NGT and monitor tolerance  advance slowly to promote growth  Encourage nippling once per shift with cues  Continue (OT) to improve po intake  Monitor I&Os  Monitor daily weight and weekly HC and L no

## 2019-04-04 NOTE — PROGRESS NOTE ADULT - SUBJECTIVE AND OBJECTIVE BOX
Progress Note: Surgery  Patient: TIA GORDON , 63y (10-Reynaldo-1955)Male   MRN: [id number]  Location: [address]  Visit: 04-03-19 Inpatient  Date: 04-04-19 @ 01:11    Hospital Day: 2    Post-op Day: 1    Procedure/Injury: S/P LAPAROSCOPIC ASSISTED SIGMOID COLECTOMY AND DIVERTING ILEOSTOMY      Events over 24h: patient tolerating procedure well, pain is controlled with PCA, no gas or BM, afebrile overnight. tolerating chips and sips    Vitals: T(F): 97.6 (04-04-19 @ 01:00), Max: 98.6 (04-03-19 @ 19:45)  HR: 97 (04-04-19 @ 01:00)  BP: 119/71 (04-04-19 @ 01:00) (106/60 - 137/74)  RR: 18 (04-04-19 @ 01:00)  SpO2: 100% (04-03-19 @ 23:30)    Diet: Diet, NPO:   With Ice Chips/Sips of Water (04-03-19 @ 19:33)      Bowel function:   Bowel movement [no]   Flatus [no]    Out:   04-03-19 @ 07:01  -  04-04-19 @ 01:11  --------------------------------------------------------  OUT:    Indwelling Catheter - Urethral: 495 mL  Total OUT: 495 mL    Physical Examination:  General: NAD, lying in bed comfortably   HEENT: NCAT, FARZANA, WNL  Heart: S1 S2, No MRG RRR   Lungs: CTABL +BS Equal BL, No WRC  Abdomen: Soft, non-distended, nontender. ileostomy bag in place  Incisions/Wounds: Dressings in place, clean, dry and intact, no signs of infection/active bleeding/drainage    Medications: [Standing]  cefoTEtan  IVPB 2 Gram(s) IV Intermittent every 12 hours  dextrose 5%. 1000 milliLiter(s) (50 mL/Hr) IV Continuous <Continuous>  dextrose 50% Injectable 12.5 Gram(s) IV Push once  dextrose 50% Injectable 25 Gram(s) IV Push once  dextrose 50% Injectable 25 Gram(s) IV Push once  insulin lispro (HumaLOG) corrective regimen sliding scale   SubCutaneous three times a day before meals  lactated ringers. 1000 milliLiter(s) (125 mL/Hr) IV Continuous <Continuous>  metoprolol tartrate Injectable 2.5 milliGRAM(s) IV Push every 6 hours  morphine PCA (5 mG/mL) 30 milliLiter(s) PCA Continuous PCA Continuous    Medications:[PRN]  dextrose 40% Gel 15 Gram(s) Oral once PRN  glucagon  Injectable 1 milliGRAM(s) IntraMuscular once PRN  naloxone Injectable 0.1 milliGRAM(s) IV Push every 3 minutes PRN  ondansetron Injectable 4 milliGRAM(s) IV Push every 6 hours PRN  ondansetron Injectable 4 milliGRAM(s) IV Push every 6 hours PRN    Labs:                        12.5   18.78 )-----------( 345      ( 04 Apr 2019 00:20 )             38.7     Date/Time: 04-04-19 @ 01:11

## 2019-04-05 ENCOUNTER — TRANSCRIPTION ENCOUNTER (OUTPATIENT)
Age: 64
End: 2019-04-05

## 2019-04-05 LAB
ANION GAP SERPL CALC-SCNC: 14 MMOL/L — SIGNIFICANT CHANGE UP (ref 7–14)
ANION GAP SERPL CALC-SCNC: 16 MMOL/L — HIGH (ref 7–14)
BLD GP AB SCN SERPL QL: SIGNIFICANT CHANGE UP
BUN SERPL-MCNC: 10 MG/DL — SIGNIFICANT CHANGE UP (ref 10–20)
BUN SERPL-MCNC: 11 MG/DL — SIGNIFICANT CHANGE UP (ref 10–20)
CALCIUM SERPL-MCNC: 8.7 MG/DL — SIGNIFICANT CHANGE UP (ref 8.5–10.1)
CALCIUM SERPL-MCNC: 9 MG/DL — SIGNIFICANT CHANGE UP (ref 8.5–10.1)
CHLORIDE SERPL-SCNC: 100 MMOL/L — SIGNIFICANT CHANGE UP (ref 98–110)
CHLORIDE SERPL-SCNC: 101 MMOL/L — SIGNIFICANT CHANGE UP (ref 98–110)
CO2 SERPL-SCNC: 23 MMOL/L — SIGNIFICANT CHANGE UP (ref 17–32)
CO2 SERPL-SCNC: 24 MMOL/L — SIGNIFICANT CHANGE UP (ref 17–32)
CREAT SERPL-MCNC: 0.8 MG/DL — SIGNIFICANT CHANGE UP (ref 0.7–1.5)
CREAT SERPL-MCNC: 0.8 MG/DL — SIGNIFICANT CHANGE UP (ref 0.7–1.5)
GLUCOSE SERPL-MCNC: 165 MG/DL — HIGH (ref 70–99)
GLUCOSE SERPL-MCNC: 211 MG/DL — HIGH (ref 70–99)
HCT VFR BLD CALC: 35.6 % — LOW (ref 42–52)
HCT VFR BLD CALC: 40.2 % — LOW (ref 42–52)
HGB BLD-MCNC: 11.4 G/DL — LOW (ref 14–18)
HGB BLD-MCNC: 12.6 G/DL — LOW (ref 14–18)
MAGNESIUM SERPL-MCNC: 1.7 MG/DL — LOW (ref 1.8–2.4)
MAGNESIUM SERPL-MCNC: 1.9 MG/DL — SIGNIFICANT CHANGE UP (ref 1.8–2.4)
MCHC RBC-ENTMCNC: 27.3 PG — SIGNIFICANT CHANGE UP (ref 27–31)
MCHC RBC-ENTMCNC: 27.7 PG — SIGNIFICANT CHANGE UP (ref 27–31)
MCHC RBC-ENTMCNC: 31.3 G/DL — LOW (ref 32–37)
MCHC RBC-ENTMCNC: 32 G/DL — SIGNIFICANT CHANGE UP (ref 32–37)
MCV RBC AUTO: 86.6 FL — SIGNIFICANT CHANGE UP (ref 80–94)
MCV RBC AUTO: 87.2 FL — SIGNIFICANT CHANGE UP (ref 80–94)
NRBC # BLD: 0 /100 WBCS — SIGNIFICANT CHANGE UP (ref 0–0)
NRBC # BLD: 0 /100 WBCS — SIGNIFICANT CHANGE UP (ref 0–0)
PHOSPHATE SERPL-MCNC: 2.5 MG/DL — SIGNIFICANT CHANGE UP (ref 2.1–4.9)
PHOSPHATE SERPL-MCNC: 3.1 MG/DL — SIGNIFICANT CHANGE UP (ref 2.1–4.9)
PLATELET # BLD AUTO: 284 K/UL — SIGNIFICANT CHANGE UP (ref 130–400)
PLATELET # BLD AUTO: 341 K/UL — SIGNIFICANT CHANGE UP (ref 130–400)
POTASSIUM SERPL-MCNC: 4.2 MMOL/L — SIGNIFICANT CHANGE UP (ref 3.5–5)
POTASSIUM SERPL-MCNC: 4.3 MMOL/L — SIGNIFICANT CHANGE UP (ref 3.5–5)
POTASSIUM SERPL-SCNC: 4.2 MMOL/L — SIGNIFICANT CHANGE UP (ref 3.5–5)
POTASSIUM SERPL-SCNC: 4.3 MMOL/L — SIGNIFICANT CHANGE UP (ref 3.5–5)
RBC # BLD: 4.11 M/UL — LOW (ref 4.7–6.1)
RBC # BLD: 4.61 M/UL — LOW (ref 4.7–6.1)
RBC # FLD: 17.2 % — HIGH (ref 11.5–14.5)
RBC # FLD: 17.3 % — HIGH (ref 11.5–14.5)
SODIUM SERPL-SCNC: 139 MMOL/L — SIGNIFICANT CHANGE UP (ref 135–146)
SODIUM SERPL-SCNC: 139 MMOL/L — SIGNIFICANT CHANGE UP (ref 135–146)
TROPONIN T SERPL-MCNC: <0.01 NG/ML — SIGNIFICANT CHANGE UP
TYPE + AB SCN PNL BLD: SIGNIFICANT CHANGE UP
WBC # BLD: 10.79 K/UL — SIGNIFICANT CHANGE UP (ref 4.8–10.8)
WBC # BLD: 12.54 K/UL — HIGH (ref 4.8–10.8)
WBC # FLD AUTO: 10.79 K/UL — SIGNIFICANT CHANGE UP (ref 4.8–10.8)
WBC # FLD AUTO: 12.54 K/UL — HIGH (ref 4.8–10.8)

## 2019-04-05 PROCEDURE — 71045 X-RAY EXAM CHEST 1 VIEW: CPT | Mod: 26

## 2019-04-05 PROCEDURE — 93010 ELECTROCARDIOGRAM REPORT: CPT

## 2019-04-05 PROCEDURE — 74018 RADEX ABDOMEN 1 VIEW: CPT | Mod: 26

## 2019-04-05 RX ORDER — PANTOPRAZOLE SODIUM 20 MG/1
40 TABLET, DELAYED RELEASE ORAL
Qty: 0 | Refills: 0 | Status: DISCONTINUED | OUTPATIENT
Start: 2019-04-05 | End: 2019-04-06

## 2019-04-05 RX ORDER — HEPARIN SODIUM 5000 [USP'U]/ML
5000 INJECTION INTRAVENOUS; SUBCUTANEOUS EVERY 8 HOURS
Qty: 0 | Refills: 0 | Status: DISCONTINUED | OUTPATIENT
Start: 2019-04-05 | End: 2019-04-09

## 2019-04-05 RX ORDER — SODIUM,POTASSIUM PHOSPHATES 278-250MG
1 POWDER IN PACKET (EA) ORAL ONCE
Qty: 0 | Refills: 0 | Status: COMPLETED | OUTPATIENT
Start: 2019-04-05 | End: 2019-04-05

## 2019-04-05 RX ADMIN — PANTOPRAZOLE SODIUM 40 MILLIGRAM(S): 20 TABLET, DELAYED RELEASE ORAL at 06:02

## 2019-04-05 RX ADMIN — Medication 1: at 11:48

## 2019-04-05 RX ADMIN — HEPARIN SODIUM 5000 UNIT(S): 5000 INJECTION INTRAVENOUS; SUBCUTANEOUS at 21:48

## 2019-04-05 RX ADMIN — ONDANSETRON 4 MILLIGRAM(S): 8 TABLET, FILM COATED ORAL at 16:13

## 2019-04-05 RX ADMIN — Medication 1: at 17:47

## 2019-04-05 RX ADMIN — Medication 1 PACKET(S): at 05:30

## 2019-04-05 RX ADMIN — SODIUM CHLORIDE 50 MILLILITER(S): 9 INJECTION, SOLUTION INTRAVENOUS at 08:52

## 2019-04-05 RX ADMIN — HEPARIN SODIUM 5000 UNIT(S): 5000 INJECTION INTRAVENOUS; SUBCUTANEOUS at 13:17

## 2019-04-05 RX ADMIN — HEPARIN SODIUM 5000 UNIT(S): 5000 INJECTION INTRAVENOUS; SUBCUTANEOUS at 06:04

## 2019-04-05 NOTE — PROGRESS NOTE ADULT - ASSESSMENT
Assessment:  63y Male patient admitted  S/P LAPAROSCOPIC ASSISTED SIGMOID COLECTOMY AND DIVERTING ILEOSTOMY      Plan:  - await return of bowel function    - Clears  - pain control, may attempt switch to PO meds if tolerating diet   - DVT & GI PPX  - Encourage Ambulation and IS

## 2019-04-05 NOTE — PROGRESS NOTE ADULT - SUBJECTIVE AND OBJECTIVE BOX
Progress Note: Surgery  Patient: TIA GORDON , 63y (10-Reynaldo-1955)Male   MRN: [id number]  Location: [address]   Visit: 04-03-19 Inpatient  Date: 04-05-19 @ 05:41    Hospital Day: 3    Post-op Day: 2    Procedure/Injury: S/P LAPAROSCOPIC ASSISTED SIGMOID COLECTOMY AND DIVERTING ILEOSTOMY      Events over 24h: tolerating pain, murillo replaced after failed TOV,     Vitals: T(F): 98 (04-05-19 @ 04:00), Max: 98.8 (04-04-19 @ 20:00)  HR: 95 (04-05-19 @ 04:00)  BP: 155/84 (04-05-19 @ 04:00) (138/78 - 156/86)  RR: 18 (04-05-19 @ 04:00)  SpO2: --    Diet: Diet, Clear Liquid (04-04-19 @ 09:20)      Bowel function:   Bowel movement [no]   Flatus [no]    Out:   04-03-19 @ 07:01  -  04-04-19 @ 07:00  --------------------------------------------------------  OUT:    Indwelling Catheter - Urethral: 1470 mL  Total OUT: 1470 mL      04-04-19 @ 07:01  -  04-05-19 @ 05:41  --------------------------------------------------------  OUT:    Indwelling Catheter - Urethral: 4425 mL    Voided: 350 mL  Total OUT: 4775 mL      Physical Examination:  General: NAD, lying in bed comfortably   HEENT: NCAT, FARZANA, WNL  Heart: S1 S2, No MRG RRR   Lungs: CTABL +BS Equal BL, No WRC  Abdomen: Soft, non-distended, nontender. ileostomy bag in place  Incisions/Wounds: Dressings in place, clean, dry and intact, no signs of infection/active bleeding/drainage    Medications: [Standing]  dextrose 5% + sodium chloride 0.9%. 1000 milliLiter(s) (100 mL/Hr) IV Continuous <Continuous>  dextrose 5%. 1000 milliLiter(s) (50 mL/Hr) IV Continuous <Continuous>  dextrose 50% Injectable 12.5 Gram(s) IV Push once  dextrose 50% Injectable 25 Gram(s) IV Push once  dextrose 50% Injectable 25 Gram(s) IV Push once  heparin  Injectable 5000 Unit(s) SubCutaneous every 8 hours  insulin lispro (HumaLOG) corrective regimen sliding scale   SubCutaneous three times a day before meals  morphine PCA (5 mG/mL) 30 milliLiter(s) PCA Continuous PCA Continuous  pantoprazole    Tablet 40 milliGRAM(s) Oral before breakfast  tamsulosin 0.4 milliGRAM(s) Oral at bedtime    Medications:[PRN]  acetaminophen   Tablet .. 650 milliGRAM(s) Oral every 6 hours PRN  dextrose 40% Gel 15 Gram(s) Oral once PRN  glucagon  Injectable 1 milliGRAM(s) IntraMuscular once PRN  naloxone Injectable 0.1 milliGRAM(s) IV Push every 3 minutes PRN  ondansetron Injectable 4 milliGRAM(s) IV Push every 6 hours PRN  ondansetron Injectable 4 milliGRAM(s) IV Push every 6 hours PRN    Labs:                        11.4   10.79 )-----------( 284      ( 05 Apr 2019 01:25 )             35.6     04-05    139  |  101  |  10  ----------------------------<  165<H>  4.3   |  24  |  0.8    Ca    8.7      05 Apr 2019 01:25  Phos  2.5     04-05  Mg     1.9     04-05      Date/Time: 04-05-19 @ 05:41

## 2019-04-05 NOTE — DISCHARGE NOTE NURSING/CASE MANAGEMENT/SOCIAL WORK - NSDCDPATPORTLINK_GEN_ALL_CORE
You can access the [facility] Patient Portal, offered by [facility], by registering with the following website: [email or web address]

## 2019-04-06 LAB
ANION GAP SERPL CALC-SCNC: 17 MMOL/L — HIGH (ref 7–14)
BUN SERPL-MCNC: 11 MG/DL — SIGNIFICANT CHANGE UP (ref 10–20)
CALCIUM SERPL-MCNC: 9.1 MG/DL — SIGNIFICANT CHANGE UP (ref 8.5–10.1)
CHLORIDE SERPL-SCNC: 101 MMOL/L — SIGNIFICANT CHANGE UP (ref 98–110)
CO2 SERPL-SCNC: 22 MMOL/L — SIGNIFICANT CHANGE UP (ref 17–32)
CREAT SERPL-MCNC: 0.8 MG/DL — SIGNIFICANT CHANGE UP (ref 0.7–1.5)
GLUCOSE SERPL-MCNC: 160 MG/DL — HIGH (ref 70–99)
HCT VFR BLD CALC: 40.5 % — LOW (ref 42–52)
HCT VFR BLD CALC: 43.3 % — SIGNIFICANT CHANGE UP (ref 42–52)
HGB BLD-MCNC: 12.8 G/DL — LOW (ref 14–18)
HGB BLD-MCNC: 13.4 G/DL — LOW (ref 14–18)
MAGNESIUM SERPL-MCNC: 1.8 MG/DL — SIGNIFICANT CHANGE UP (ref 1.8–2.4)
MCHC RBC-ENTMCNC: 27.3 PG — SIGNIFICANT CHANGE UP (ref 27–31)
MCHC RBC-ENTMCNC: 27.6 PG — SIGNIFICANT CHANGE UP (ref 27–31)
MCHC RBC-ENTMCNC: 30.9 G/DL — LOW (ref 32–37)
MCHC RBC-ENTMCNC: 31.6 G/DL — LOW (ref 32–37)
MCV RBC AUTO: 86.4 FL — SIGNIFICANT CHANGE UP (ref 80–94)
MCV RBC AUTO: 89.1 FL — SIGNIFICANT CHANGE UP (ref 80–94)
NRBC # BLD: 0 /100 WBCS — SIGNIFICANT CHANGE UP (ref 0–0)
NRBC # BLD: 0 /100 WBCS — SIGNIFICANT CHANGE UP (ref 0–0)
PHOSPHATE SERPL-MCNC: 3.2 MG/DL — SIGNIFICANT CHANGE UP (ref 2.1–4.9)
PLATELET # BLD AUTO: 314 K/UL — SIGNIFICANT CHANGE UP (ref 130–400)
PLATELET # BLD AUTO: 359 K/UL — SIGNIFICANT CHANGE UP (ref 130–400)
POTASSIUM SERPL-MCNC: 4.2 MMOL/L — SIGNIFICANT CHANGE UP (ref 3.5–5)
POTASSIUM SERPL-SCNC: 4.2 MMOL/L — SIGNIFICANT CHANGE UP (ref 3.5–5)
RBC # BLD: 4.69 M/UL — LOW (ref 4.7–6.1)
RBC # BLD: 4.86 M/UL — SIGNIFICANT CHANGE UP (ref 4.7–6.1)
RBC # FLD: 17.3 % — HIGH (ref 11.5–14.5)
RBC # FLD: 17.4 % — HIGH (ref 11.5–14.5)
SODIUM SERPL-SCNC: 140 MMOL/L — SIGNIFICANT CHANGE UP (ref 135–146)
WBC # BLD: 12.79 K/UL — HIGH (ref 4.8–10.8)
WBC # BLD: 13.19 K/UL — HIGH (ref 4.8–10.8)
WBC # FLD AUTO: 12.79 K/UL — HIGH (ref 4.8–10.8)
WBC # FLD AUTO: 13.19 K/UL — HIGH (ref 4.8–10.8)

## 2019-04-06 RX ORDER — PANTOPRAZOLE SODIUM 20 MG/1
40 TABLET, DELAYED RELEASE ORAL ONCE
Qty: 0 | Refills: 0 | Status: COMPLETED | OUTPATIENT
Start: 2019-04-06 | End: 2019-04-06

## 2019-04-06 RX ORDER — KETOROLAC TROMETHAMINE 30 MG/ML
15 SYRINGE (ML) INJECTION ONCE
Qty: 0 | Refills: 0 | Status: DISCONTINUED | OUTPATIENT
Start: 2019-04-06 | End: 2019-04-09

## 2019-04-06 RX ORDER — SODIUM CHLORIDE 9 MG/ML
1000 INJECTION, SOLUTION INTRAVENOUS ONCE
Qty: 0 | Refills: 0 | Status: COMPLETED | OUTPATIENT
Start: 2019-04-06 | End: 2019-04-06

## 2019-04-06 RX ORDER — METOPROLOL TARTRATE 50 MG
2.5 TABLET ORAL EVERY 6 HOURS
Qty: 0 | Refills: 0 | Status: DISCONTINUED | OUTPATIENT
Start: 2019-04-06 | End: 2019-04-06

## 2019-04-06 RX ORDER — PANTOPRAZOLE SODIUM 20 MG/1
40 TABLET, DELAYED RELEASE ORAL DAILY
Qty: 0 | Refills: 0 | Status: DISCONTINUED | OUTPATIENT
Start: 2019-04-06 | End: 2019-04-09

## 2019-04-06 RX ORDER — MAGNESIUM SULFATE 500 MG/ML
2 VIAL (ML) INJECTION ONCE
Qty: 0 | Refills: 0 | Status: COMPLETED | OUTPATIENT
Start: 2019-04-06 | End: 2019-04-06

## 2019-04-06 RX ORDER — METOPROLOL TARTRATE 50 MG
25 TABLET ORAL DAILY
Qty: 0 | Refills: 0 | Status: DISCONTINUED | OUTPATIENT
Start: 2019-04-06 | End: 2019-04-06

## 2019-04-06 RX ORDER — SODIUM CHLORIDE 9 MG/ML
500 INJECTION, SOLUTION INTRAVENOUS ONCE
Qty: 0 | Refills: 0 | Status: DISCONTINUED | OUTPATIENT
Start: 2019-04-06 | End: 2019-04-06

## 2019-04-06 RX ORDER — NEBIVOLOL HYDROCHLORIDE 5 MG/1
10 TABLET ORAL DAILY
Qty: 0 | Refills: 0 | Status: DISCONTINUED | OUTPATIENT
Start: 2019-04-06 | End: 2019-04-09

## 2019-04-06 RX ORDER — MORPHINE SULFATE 50 MG/1
2 CAPSULE, EXTENDED RELEASE ORAL EVERY 6 HOURS
Qty: 0 | Refills: 0 | Status: DISCONTINUED | OUTPATIENT
Start: 2019-04-06 | End: 2019-04-09

## 2019-04-06 RX ORDER — OXYCODONE HYDROCHLORIDE 5 MG/1
5 TABLET ORAL EVERY 6 HOURS
Qty: 0 | Refills: 0 | Status: DISCONTINUED | OUTPATIENT
Start: 2019-04-06 | End: 2019-04-09

## 2019-04-06 RX ADMIN — TAMSULOSIN HYDROCHLORIDE 0.4 MILLIGRAM(S): 0.4 CAPSULE ORAL at 21:22

## 2019-04-06 RX ADMIN — Medication 50 GRAM(S): at 06:01

## 2019-04-06 RX ADMIN — HEPARIN SODIUM 5000 UNIT(S): 5000 INJECTION INTRAVENOUS; SUBCUTANEOUS at 21:23

## 2019-04-06 RX ADMIN — PANTOPRAZOLE SODIUM 40 MILLIGRAM(S): 20 TABLET, DELAYED RELEASE ORAL at 13:08

## 2019-04-06 RX ADMIN — Medication 1: at 13:09

## 2019-04-06 RX ADMIN — Medication 25 MILLIGRAM(S): at 13:09

## 2019-04-06 RX ADMIN — Medication 1: at 08:51

## 2019-04-06 RX ADMIN — HEPARIN SODIUM 5000 UNIT(S): 5000 INJECTION INTRAVENOUS; SUBCUTANEOUS at 13:10

## 2019-04-06 RX ADMIN — Medication 650 MILLIGRAM(S): at 19:42

## 2019-04-06 RX ADMIN — PANTOPRAZOLE SODIUM 40 MILLIGRAM(S): 20 TABLET, DELAYED RELEASE ORAL at 21:39

## 2019-04-06 RX ADMIN — SODIUM CHLORIDE 125 MILLILITER(S): 9 INJECTION, SOLUTION INTRAVENOUS at 13:55

## 2019-04-06 RX ADMIN — ONDANSETRON 4 MILLIGRAM(S): 8 TABLET, FILM COATED ORAL at 13:54

## 2019-04-06 RX ADMIN — Medication 1: at 17:15

## 2019-04-06 RX ADMIN — NEBIVOLOL HYDROCHLORIDE 10 MILLIGRAM(S): 5 TABLET ORAL at 21:23

## 2019-04-06 RX ADMIN — SODIUM CHLORIDE 500 MILLILITER(S): 9 INJECTION, SOLUTION INTRAVENOUS at 11:03

## 2019-04-06 RX ADMIN — HEPARIN SODIUM 5000 UNIT(S): 5000 INJECTION INTRAVENOUS; SUBCUTANEOUS at 06:37

## 2019-04-06 NOTE — PROVIDER CONTACT NOTE (OTHER) - ACTION/TREATMENT ORDERED:
pt requesting something to relieve heartburn. md aware will order appropriate intervention
will reassess, pt was walking around, no c/o pain. murillo in place, pca pump in place. pt c/o nausea, dose of zofran given. will reassess vs and inform md
Place a Payan. WIll put in an order.

## 2019-04-06 NOTE — PROGRESS NOTE ADULT - SUBJECTIVE AND OBJECTIVE BOX
Patient seen and examined with the residents    Feels better.  Vss   Abdomen- soft, mildly distended  ileosotomy- functional- clear billous. emptied multiple times this am    wbc 12.7  ( hemoconcentrated) improved

## 2019-04-06 NOTE — PROGRESS NOTE ADULT - ASSESSMENT
stable    pod 3      trial of void.  monitor i/o . replace losses .  Would consider CT scan if wbc increases or fever or recurrent tachycardia.

## 2019-04-06 NOTE — PROGRESS NOTE ADULT - ASSESSMENT
63y Male patient s/p laparoscopic assisted sigmoid colectomy and diverting ileostomy.  Post op ileus.   stable, improved tachycardia.     Plan:  - NPO until resolution of ileus  - labs, correct lytes.  - card enzymes negative.  - ambulation  - d/c PCA today, sporadic narcotic use.  - DVT & GI PPX  - Encourage Ambulation and IS

## 2019-04-06 NOTE — PROGRESS NOTE ADULT - SUBJECTIVE AND OBJECTIVE BOX
GENERAL SURGERY PROGRESS NOTE     TIA GORDON  63y  Male  Hospital day :3d  POD:  Procedure: Colectomy, left or sigmoid, laparoscopic    OVERNIGHT EVENTS:  tachycardic to 111 in evening after ambulation.  Noted to be more distended.  EKG sinus tach.  Cardiac enzymes negative.  Abd xray showed ileus pattern as well as exam.  Patient made NPO.  No n/v.  Tachycardia improved.  murillo in.      T(F): 99.1 (04-06-19 @ 00:00), Max: 99.1 (04-06-19 @ 00:00)  HR: 88 (04-06-19 @ 04:07) (88 - 111)  BP: 161/68 (04-06-19 @ 00:00) (146/84 - 171/89)  RR: 18 (04-06-19 @ 00:00) (18 - 18)    DIET/FLUIDS: dextrose 5% + sodium chloride 0.9%. 1000 milliLiter(s) IV Continuous <Continuous>  dextrose 5%. 1000 milliLiter(s) IV Continuous <Continuous>    NG:                                                                                  BM:   04-04-19 @ 07:01  -  04-05-19 @ 07:00  --------------------------------------------------------  OUT: 0 mL      URINE:   04-04-19 @ 07:01  -  04-05-19 @ 07:00  --------------------------------------------------------  OUT: 5325 mL       GI proph:  pantoprazole    Tablet 40 milliGRAM(s) Oral before breakfast    AC/ proph: heparin  Injectable 5000 Unit(s) SubCutaneous every 8 hours      PHYSICAL EXAM:  GENERAL: NAD  CHEST/LUNG: Clear to auscultation bilaterally  HEART: Regular rate and rhythm  ABDOMEN: Soft, Nontender, distended; tender over incisions.  EXTREMITIES:  No clubbing, cyanosis, or edema      LABS  Labs:  CAPILLARY BLOOD GLUCOSE      POCT Blood Glucose.: 187 mg/dL (06 Apr 2019 04:21)  POCT Blood Glucose.: 176 mg/dL (05 Apr 2019 21:22)  POCT Blood Glucose.: 182 mg/dL (05 Apr 2019 17:44)  POCT Blood Glucose.: 173 mg/dL (05 Apr 2019 11:41)  POCT Blood Glucose.: 147 mg/dL (05 Apr 2019 07:57)                          12.8   13.19 )-----------( 359      ( 05 Apr 2019 23:30 )             40.5         04-05    140  |  101  |  11  ----------------------------<  160<H>  4.2   |  22  |  0.8      Calcium, Total Serum: 9.1 mg/dL (04-05-19 @ 23:30)    Coags:    CARDIAC MARKERS ( 05 Apr 2019 18:20 )  x     / <0.01 ng/mL / x     / x     / x            RADIOLOGY & ADDITIONAL TESTS:  Final reads pending on CXR and abd XR.    A/P

## 2019-04-07 LAB
ANION GAP SERPL CALC-SCNC: 15 MMOL/L — HIGH (ref 7–14)
BASOPHILS # BLD AUTO: 0.03 K/UL — SIGNIFICANT CHANGE UP (ref 0–0.2)
BASOPHILS NFR BLD AUTO: 0.3 % — SIGNIFICANT CHANGE UP (ref 0–1)
BUN SERPL-MCNC: 14 MG/DL — SIGNIFICANT CHANGE UP (ref 10–20)
CALCIUM SERPL-MCNC: 8.9 MG/DL — SIGNIFICANT CHANGE UP (ref 8.5–10.1)
CHLORIDE SERPL-SCNC: 100 MMOL/L — SIGNIFICANT CHANGE UP (ref 98–110)
CO2 SERPL-SCNC: 22 MMOL/L — SIGNIFICANT CHANGE UP (ref 17–32)
CREAT SERPL-MCNC: 0.8 MG/DL — SIGNIFICANT CHANGE UP (ref 0.7–1.5)
EOSINOPHIL # BLD AUTO: 0.23 K/UL — SIGNIFICANT CHANGE UP (ref 0–0.7)
EOSINOPHIL NFR BLD AUTO: 2.1 % — SIGNIFICANT CHANGE UP (ref 0–8)
GLUCOSE SERPL-MCNC: 179 MG/DL — HIGH (ref 70–99)
HCT VFR BLD CALC: 36.8 % — LOW (ref 42–52)
HGB BLD-MCNC: 11.9 G/DL — LOW (ref 14–18)
IMM GRANULOCYTES NFR BLD AUTO: 0.6 % — HIGH (ref 0.1–0.3)
LYMPHOCYTES # BLD AUTO: 0.96 K/UL — LOW (ref 1.2–3.4)
LYMPHOCYTES # BLD AUTO: 9 % — LOW (ref 20.5–51.1)
MAGNESIUM SERPL-MCNC: 1.7 MG/DL — LOW (ref 1.8–2.4)
MCHC RBC-ENTMCNC: 27.9 PG — SIGNIFICANT CHANGE UP (ref 27–31)
MCHC RBC-ENTMCNC: 32.3 G/DL — SIGNIFICANT CHANGE UP (ref 32–37)
MCV RBC AUTO: 86.2 FL — SIGNIFICANT CHANGE UP (ref 80–94)
MONOCYTES # BLD AUTO: 0.69 K/UL — HIGH (ref 0.1–0.6)
MONOCYTES NFR BLD AUTO: 6.4 % — SIGNIFICANT CHANGE UP (ref 1.7–9.3)
NEUTROPHILS # BLD AUTO: 8.73 K/UL — HIGH (ref 1.4–6.5)
NEUTROPHILS NFR BLD AUTO: 81.6 % — HIGH (ref 42.2–75.2)
NRBC # BLD: 0 /100 WBCS — SIGNIFICANT CHANGE UP (ref 0–0)
PHOSPHATE SERPL-MCNC: 3 MG/DL — SIGNIFICANT CHANGE UP (ref 2.1–4.9)
PLATELET # BLD AUTO: 345 K/UL — SIGNIFICANT CHANGE UP (ref 130–400)
POTASSIUM SERPL-MCNC: 4.4 MMOL/L — SIGNIFICANT CHANGE UP (ref 3.5–5)
POTASSIUM SERPL-SCNC: 4.4 MMOL/L — SIGNIFICANT CHANGE UP (ref 3.5–5)
RBC # BLD: 4.27 M/UL — LOW (ref 4.7–6.1)
RBC # FLD: 17.2 % — HIGH (ref 11.5–14.5)
SODIUM SERPL-SCNC: 137 MMOL/L — SIGNIFICANT CHANGE UP (ref 135–146)
WBC # BLD: 10.7 K/UL — SIGNIFICANT CHANGE UP (ref 4.8–10.8)
WBC # FLD AUTO: 10.7 K/UL — SIGNIFICANT CHANGE UP (ref 4.8–10.8)

## 2019-04-07 RX ORDER — SODIUM CHLORIDE 9 MG/ML
1000 INJECTION, SOLUTION INTRAVENOUS
Qty: 0 | Refills: 0 | Status: DISCONTINUED | OUTPATIENT
Start: 2019-04-07 | End: 2019-04-08

## 2019-04-07 RX ORDER — SODIUM CHLORIDE 9 MG/ML
300 INJECTION INTRAMUSCULAR; INTRAVENOUS; SUBCUTANEOUS ONCE
Qty: 0 | Refills: 0 | Status: DISCONTINUED | OUTPATIENT
Start: 2019-04-07 | End: 2019-04-07

## 2019-04-07 RX ORDER — SODIUM CHLORIDE 9 MG/ML
300 INJECTION INTRAMUSCULAR; INTRAVENOUS; SUBCUTANEOUS ONCE
Qty: 0 | Refills: 0 | Status: COMPLETED | OUTPATIENT
Start: 2019-04-07 | End: 2019-04-07

## 2019-04-07 RX ORDER — MAGNESIUM SULFATE 500 MG/ML
2 VIAL (ML) INJECTION ONCE
Qty: 0 | Refills: 0 | Status: COMPLETED | OUTPATIENT
Start: 2019-04-07 | End: 2019-04-07

## 2019-04-07 RX ORDER — SODIUM CHLORIDE 9 MG/ML
500 INJECTION, SOLUTION INTRAVENOUS ONCE
Qty: 0 | Refills: 0 | Status: COMPLETED | OUTPATIENT
Start: 2019-04-07 | End: 2019-04-07

## 2019-04-07 RX ORDER — LOPERAMIDE HCL 2 MG
2 TABLET ORAL EVERY 12 HOURS
Qty: 0 | Refills: 0 | Status: DISCONTINUED | OUTPATIENT
Start: 2019-04-07 | End: 2019-04-08

## 2019-04-07 RX ADMIN — Medication 50 GRAM(S): at 11:23

## 2019-04-07 RX ADMIN — SODIUM CHLORIDE 75 MILLILITER(S): 9 INJECTION, SOLUTION INTRAVENOUS at 23:23

## 2019-04-07 RX ADMIN — PANTOPRAZOLE SODIUM 40 MILLIGRAM(S): 20 TABLET, DELAYED RELEASE ORAL at 11:24

## 2019-04-07 RX ADMIN — Medication 2: at 17:28

## 2019-04-07 RX ADMIN — SODIUM CHLORIDE 600 MILLILITER(S): 9 INJECTION INTRAMUSCULAR; INTRAVENOUS; SUBCUTANEOUS at 23:23

## 2019-04-07 RX ADMIN — HEPARIN SODIUM 5000 UNIT(S): 5000 INJECTION INTRAVENOUS; SUBCUTANEOUS at 05:12

## 2019-04-07 RX ADMIN — Medication 2: at 14:16

## 2019-04-07 RX ADMIN — SODIUM CHLORIDE 250 MILLILITER(S): 9 INJECTION, SOLUTION INTRAVENOUS at 14:47

## 2019-04-07 RX ADMIN — HEPARIN SODIUM 5000 UNIT(S): 5000 INJECTION INTRAVENOUS; SUBCUTANEOUS at 21:01

## 2019-04-07 RX ADMIN — TAMSULOSIN HYDROCHLORIDE 0.4 MILLIGRAM(S): 0.4 CAPSULE ORAL at 21:01

## 2019-04-07 RX ADMIN — Medication 2 MILLIGRAM(S): at 17:29

## 2019-04-07 RX ADMIN — HEPARIN SODIUM 5000 UNIT(S): 5000 INJECTION INTRAVENOUS; SUBCUTANEOUS at 14:20

## 2019-04-07 RX ADMIN — SODIUM CHLORIDE 75 MILLILITER(S): 9 INJECTION, SOLUTION INTRAVENOUS at 14:20

## 2019-04-07 NOTE — PROGRESS NOTE ADULT - SUBJECTIVE AND OBJECTIVE BOX
Progress Note: Surgery  Patient: TIA GORDON , 63y (10-Reynaldo-1955)Male   MRN: [id number]  Location: [address]  Visit: 04-03-19 Inpatient  Date: 04-07-19 @ 04:30    Events over 24h: patient doing very well, pain is improving, he brought in his home Bystolic medication and it was approved by pharmacy, ambulating, afebrile      Vitals: T(F): 98.2 (04-07-19 @ 00:00), Max: 98.4 (04-06-19 @ 08:25)  HR: 98 (04-07-19 @ 00:00)  BP: 166/89 (04-07-19 @ 00:00) (139/80 - 184/80)  RR: 19 (04-07-19 @ 00:00)  SpO2: --    Diet: Diet, Clear Liquid (04-06-19 @ 15:04)      Bowel function:   Bowel movement []   Flatus []    In:   04-05-19 @ 07:01  -  04-06-19 @ 07:00  --------------------------------------------------------  IN: 1460 mL    04-06-19 @ 07:01  -  04-07-19 @ 04:30  --------------------------------------------------------  IN: 1475 mL      Out:   04-05-19 @ 07:01  -  04-06-19 @ 07:00  --------------------------------------------------------  OUT:    Ileostomy: 1025 mL    Indwelling Catheter - Urethral: 2315 mL  Total OUT: 3340 mL      04-06-19 @ 07:01  -  04-07-19 @ 04:30  --------------------------------------------------------  OUT:    Ileostomy: 975 mL    Indwelling Catheter - Urethral: 300 mL    Voided: 300 mL  Total OUT: 1575 mL        Net:   04-05-19 @ 07:01  -  04-06-19 @ 07:00  --------------------------------------------------------  NET: -1880 mL    04-06-19 @ 07:01  -  04-07-19 @ 04:30  --------------------------------------------------------  NET: -100 mL        Physical Examination:  General: NAD, lying in bed comfortably   HEENT: NCAT, FARZANA, WNL  Heart: S1 S2, No MRG RRR   Lungs: CTABL +BS Equal BL, No WRC  Abdomen: Soft, non-distended, tender, ostomy in place   Incisions/Wounds: Dressings in place, clean, dry and intact, no signs of infection/active bleeding/drainage    Medications: [Standing]  dextrose 5% + sodium chloride 0.9%. 1000 milliLiter(s) (125 mL/Hr) IV Continuous <Continuous>  dextrose 5%. 1000 milliLiter(s) (50 mL/Hr) IV Continuous <Continuous>  dextrose 50% Injectable 12.5 Gram(s) IV Push once  dextrose 50% Injectable 25 Gram(s) IV Push once  dextrose 50% Injectable 25 Gram(s) IV Push once  heparin  Injectable 5000 Unit(s) SubCutaneous every 8 hours  insulin lispro (HumaLOG) corrective regimen sliding scale   SubCutaneous three times a day before meals  ketorolac   Injectable 15 milliGRAM(s) IV Push once  nebivolol 10 milliGRAM(s) Oral daily  pantoprazole  Injectable 40 milliGRAM(s) IV Push daily  tamsulosin 0.4 milliGRAM(s) Oral at bedtime    Medications:[PRN]  acetaminophen   Tablet .. 650 milliGRAM(s) Oral every 6 hours PRN  dextrose 40% Gel 15 Gram(s) Oral once PRN  glucagon  Injectable 1 milliGRAM(s) IntraMuscular once PRN  morphine  - Injectable 2 milliGRAM(s) IV Push every 6 hours PRN  naloxone Injectable 0.1 milliGRAM(s) IV Push every 3 minutes PRN  ondansetron Injectable 4 milliGRAM(s) IV Push every 6 hours PRN  ondansetron Injectable 4 milliGRAM(s) IV Push every 6 hours PRN  oxyCODONE    IR 5 milliGRAM(s) Oral every 6 hours PRN    Labs:                        11.9   10.70 )-----------( 345      ( 06 Apr 2019 23:30 )             36.8     04-06    137  |  100  |  14  ----------------------------<  179<H>  4.4   |  22  |  0.8    Ca    8.9      06 Apr 2019 23:30  Phos  3.0     04-06  Mg     1.7     04-06    CARDIAC MARKERS ( 05 Apr 2019 18:20 )  x     / <0.01 ng/mL / x     / x     / x          Date/Time: 04-07-19 @ 04:30

## 2019-04-07 NOTE — PROGRESS NOTE ADULT - ASSESSMENT
63y Male patient s/p laparoscopic assisted sigmoid colectomy and diverting ileostomy.  Post op ileus.   stable, improved tachycardia.     Plan:  - Clears  - Replete ileostomy output 1: 0.5 cc  - labs, correct lytes.  - ambulation  - DVT & GI PPX  - Encourage Ambulation and IS

## 2019-04-08 LAB
ANION GAP SERPL CALC-SCNC: 12 MMOL/L — SIGNIFICANT CHANGE UP (ref 7–14)
BASOPHILS # BLD AUTO: 0.03 K/UL — SIGNIFICANT CHANGE UP (ref 0–0.2)
BASOPHILS NFR BLD AUTO: 0.4 % — SIGNIFICANT CHANGE UP (ref 0–1)
BUN SERPL-MCNC: 10 MG/DL — SIGNIFICANT CHANGE UP (ref 10–20)
CALCIUM SERPL-MCNC: 8.9 MG/DL — SIGNIFICANT CHANGE UP (ref 8.5–10.1)
CHLORIDE SERPL-SCNC: 102 MMOL/L — SIGNIFICANT CHANGE UP (ref 98–110)
CO2 SERPL-SCNC: 24 MMOL/L — SIGNIFICANT CHANGE UP (ref 17–32)
CREAT SERPL-MCNC: 0.7 MG/DL — SIGNIFICANT CHANGE UP (ref 0.7–1.5)
EOSINOPHIL # BLD AUTO: 0.5 K/UL — SIGNIFICANT CHANGE UP (ref 0–0.7)
EOSINOPHIL NFR BLD AUTO: 6.2 % — SIGNIFICANT CHANGE UP (ref 0–8)
GLUCOSE SERPL-MCNC: 183 MG/DL — HIGH (ref 70–99)
HCT VFR BLD CALC: 35.9 % — LOW (ref 42–52)
HGB BLD-MCNC: 11.6 G/DL — LOW (ref 14–18)
IMM GRANULOCYTES NFR BLD AUTO: 0.5 % — HIGH (ref 0.1–0.3)
LYMPHOCYTES # BLD AUTO: 1.49 K/UL — SIGNIFICANT CHANGE UP (ref 1.2–3.4)
LYMPHOCYTES # BLD AUTO: 18.6 % — LOW (ref 20.5–51.1)
MAGNESIUM SERPL-MCNC: 1.8 MG/DL — SIGNIFICANT CHANGE UP (ref 1.8–2.4)
MCHC RBC-ENTMCNC: 27.9 PG — SIGNIFICANT CHANGE UP (ref 27–31)
MCHC RBC-ENTMCNC: 32.3 G/DL — SIGNIFICANT CHANGE UP (ref 32–37)
MCV RBC AUTO: 86.3 FL — SIGNIFICANT CHANGE UP (ref 80–94)
MONOCYTES # BLD AUTO: 0.69 K/UL — HIGH (ref 0.1–0.6)
MONOCYTES NFR BLD AUTO: 8.6 % — SIGNIFICANT CHANGE UP (ref 1.7–9.3)
NEUTROPHILS # BLD AUTO: 5.28 K/UL — SIGNIFICANT CHANGE UP (ref 1.4–6.5)
NEUTROPHILS NFR BLD AUTO: 65.7 % — SIGNIFICANT CHANGE UP (ref 42.2–75.2)
NRBC # BLD: 0 /100 WBCS — SIGNIFICANT CHANGE UP (ref 0–0)
PHOSPHATE SERPL-MCNC: 3.3 MG/DL — SIGNIFICANT CHANGE UP (ref 2.1–4.9)
PLATELET # BLD AUTO: 357 K/UL — SIGNIFICANT CHANGE UP (ref 130–400)
POTASSIUM SERPL-MCNC: 4.3 MMOL/L — SIGNIFICANT CHANGE UP (ref 3.5–5)
POTASSIUM SERPL-SCNC: 4.3 MMOL/L — SIGNIFICANT CHANGE UP (ref 3.5–5)
RBC # BLD: 4.16 M/UL — LOW (ref 4.7–6.1)
RBC # FLD: 17 % — HIGH (ref 11.5–14.5)
SODIUM SERPL-SCNC: 138 MMOL/L — SIGNIFICANT CHANGE UP (ref 135–146)
WBC # BLD: 8.03 K/UL — SIGNIFICANT CHANGE UP (ref 4.8–10.8)
WBC # FLD AUTO: 8.03 K/UL — SIGNIFICANT CHANGE UP (ref 4.8–10.8)

## 2019-04-08 RX ORDER — INSULIN LISPRO 100/ML
5 VIAL (ML) SUBCUTANEOUS ONCE
Qty: 0 | Refills: 0 | Status: COMPLETED | OUTPATIENT
Start: 2019-04-08 | End: 2019-04-08

## 2019-04-08 RX ORDER — SODIUM CHLORIDE 9 MG/ML
300 INJECTION INTRAMUSCULAR; INTRAVENOUS; SUBCUTANEOUS ONCE
Qty: 0 | Refills: 0 | Status: COMPLETED | OUTPATIENT
Start: 2019-04-08 | End: 2019-04-08

## 2019-04-08 RX ORDER — LOPERAMIDE HCL 2 MG
4 TABLET ORAL EVERY 12 HOURS
Qty: 0 | Refills: 0 | Status: DISCONTINUED | OUTPATIENT
Start: 2019-04-08 | End: 2019-04-09

## 2019-04-08 RX ORDER — SODIUM CHLORIDE 9 MG/ML
400 INJECTION INTRAMUSCULAR; INTRAVENOUS; SUBCUTANEOUS ONCE
Qty: 0 | Refills: 0 | Status: COMPLETED | OUTPATIENT
Start: 2019-04-08 | End: 2019-04-08

## 2019-04-08 RX ORDER — SODIUM CHLORIDE 9 MG/ML
350 INJECTION INTRAMUSCULAR; INTRAVENOUS; SUBCUTANEOUS ONCE
Qty: 0 | Refills: 0 | Status: COMPLETED | OUTPATIENT
Start: 2019-04-08 | End: 2019-04-08

## 2019-04-08 RX ADMIN — HEPARIN SODIUM 5000 UNIT(S): 5000 INJECTION INTRAVENOUS; SUBCUTANEOUS at 13:10

## 2019-04-08 RX ADMIN — Medication 1: at 12:53

## 2019-04-08 RX ADMIN — SODIUM CHLORIDE 700 MILLILITER(S): 9 INJECTION INTRAMUSCULAR; INTRAVENOUS; SUBCUTANEOUS at 16:43

## 2019-04-08 RX ADMIN — HEPARIN SODIUM 5000 UNIT(S): 5000 INJECTION INTRAVENOUS; SUBCUTANEOUS at 21:27

## 2019-04-08 RX ADMIN — Medication 5 UNIT(S): at 22:15

## 2019-04-08 RX ADMIN — Medication 4 MILLIGRAM(S): at 17:49

## 2019-04-08 RX ADMIN — Medication 2 MILLIGRAM(S): at 05:06

## 2019-04-08 RX ADMIN — Medication 1: at 08:28

## 2019-04-08 RX ADMIN — PANTOPRAZOLE SODIUM 40 MILLIGRAM(S): 20 TABLET, DELAYED RELEASE ORAL at 12:53

## 2019-04-08 RX ADMIN — TAMSULOSIN HYDROCHLORIDE 0.4 MILLIGRAM(S): 0.4 CAPSULE ORAL at 21:26

## 2019-04-08 RX ADMIN — SODIUM CHLORIDE 400 MILLILITER(S): 9 INJECTION INTRAMUSCULAR; INTRAVENOUS; SUBCUTANEOUS at 09:46

## 2019-04-08 RX ADMIN — HEPARIN SODIUM 5000 UNIT(S): 5000 INJECTION INTRAVENOUS; SUBCUTANEOUS at 05:06

## 2019-04-08 RX ADMIN — SODIUM CHLORIDE 300 MILLILITER(S): 9 INJECTION INTRAMUSCULAR; INTRAVENOUS; SUBCUTANEOUS at 03:52

## 2019-04-08 RX ADMIN — Medication 1: at 17:14

## 2019-04-08 RX ADMIN — NEBIVOLOL HYDROCHLORIDE 10 MILLIGRAM(S): 5 TABLET ORAL at 17:49

## 2019-04-08 NOTE — PROGRESS NOTE ADULT - SUBJECTIVE AND OBJECTIVE BOX
Progress Note: Surgery  Patient: TIA GORDON , 63y (10-Reynaldo-1955)Male   MRN: [id number]  Location: [address] 020   Visit: 04-03-19 Inpatient  Date: 04-08-19 @ 05:47    Post-op Day: 5    Procedure/Injury: s/p lap assisted left hemicolectomy with primary anastomosis     Events over 24h: tolerating Fulls, on loperamide for high ileostomy output, pain is improved, ambulating     Vitals: T(F): 97.6 (04-08-19 @ 00:00), Max: 97.6 (04-08-19 @ 00:00)  HR: 73 (04-08-19 @ 00:00)  BP: 154/79 (04-08-19 @ 00:00) (153/88 - 159/82)  RR: 18 (04-08-19 @ 00:00)  SpO2: --    Diet: Diet, Full Liquid:   Consistent Carbohydrate Evening Snack (04-07-19 @ 11:07)      Bowel function:   Bowel movement [x]   Flatus [x]    In:   04-06-19 @ 07:01  -  04-07-19 @ 07:00  --------------------------------------------------------  IN: 1475 mL    04-07-19 @ 07:01  -  04-08-19 @ 05:47  --------------------------------------------------------  IN: 900 mL      Out:   04-06-19 @ 07:01  -  04-07-19 @ 07:00  --------------------------------------------------------  OUT:    Ileostomy: 1625 mL    Indwelling Catheter - Urethral: 300 mL    Voided: 750 mL  Total OUT: 2675 mL      04-07-19 @ 07:01  -  04-08-19 @ 05:47  --------------------------------------------------------  OUT:    Ileostomy: 1875 mL    Voided: 1425 mL  Total OUT: 3300 mL    Physical Examination:  General: NAD, lying in bed comfortably   HEENT: NCAT, FARZANA, WNL  Heart: S1 S2, No MRG RRR   Lungs: CTABL +BS Equal BL, No WRC  Abdomen: Soft, non-distended, tender, ostomy in place   Incisions/Wounds: Dressings in place, clean, dry and intact, no signs of infection/active bleeding/drainage      Medications: [Standing]  dextrose 5% + sodium chloride 0.9%. 1000 milliLiter(s) (75 mL/Hr) IV Continuous <Continuous>  dextrose 5%. 1000 milliLiter(s) (50 mL/Hr) IV Continuous <Continuous>  dextrose 50% Injectable 12.5 Gram(s) IV Push once  dextrose 50% Injectable 25 Gram(s) IV Push once  dextrose 50% Injectable 25 Gram(s) IV Push once  heparin  Injectable 5000 Unit(s) SubCutaneous every 8 hours  insulin lispro (HumaLOG) corrective regimen sliding scale   SubCutaneous three times a day before meals  ketorolac   Injectable 15 milliGRAM(s) IV Push once  loperamide 2 milliGRAM(s) Oral every 12 hours  nebivolol 10 milliGRAM(s) Oral daily  pantoprazole  Injectable 40 milliGRAM(s) IV Push daily  tamsulosin 0.4 milliGRAM(s) Oral at bedtime    Medications:[PRN]  acetaminophen   Tablet .. 650 milliGRAM(s) Oral every 6 hours PRN  dextrose 40% Gel 15 Gram(s) Oral once PRN  glucagon  Injectable 1 milliGRAM(s) IntraMuscular once PRN  morphine  - Injectable 2 milliGRAM(s) IV Push every 6 hours PRN  naloxone Injectable 0.1 milliGRAM(s) IV Push every 3 minutes PRN  ondansetron Injectable 4 milliGRAM(s) IV Push every 6 hours PRN  ondansetron Injectable 4 milliGRAM(s) IV Push every 6 hours PRN  oxyCODONE    IR 5 milliGRAM(s) Oral every 6 hours PRN    Labs:                        11.6   8.03  )-----------( 357      ( 08 Apr 2019 01:00 )             35.9     04-08    138  |  102  |  10  ----------------------------<  183<H>  4.3   |  24  |  0.7    Ca    8.9      08 Apr 2019 01:00  Phos  3.3     04-08  Mg     1.8     04-08    Date/Time: 04-08-19 @ 05:47

## 2019-04-08 NOTE — PROGRESS NOTE ADULT - ASSESSMENT
63y Male patient s/p laparoscopic assisted sigmoid colectomy and diverting ileostomy.  Post op ileus now reeoslved    stable, improved tachycardia.     Plan:  - Fulls  - Replete ileostomy output 1: 0.5 cc  - labs, correct lytes.  - ambulation  - DVT & GI PPX  - Encourage Ambulation and IS

## 2019-04-08 NOTE — CONSULT NOTE ADULT - ASSESSMENT
Patient is a 64 y/o with PMHx of DM, HTN, Diverticulosis, and Diverticulitis complicated by an abscess. Patient had Left Hemicolectomy with Anastomosis as well as loop with Ostomy. Patient feels well since the surgery and has no current complaints. Surgical site well healed and loop, Ostomy site appears well.    Diverticulitis complicated by abscess formation, post surgical resection and Anastomosis   - Clinically well appearing  - Plan as per surgical team

## 2019-04-08 NOTE — CONSULT NOTE ADULT - SUBJECTIVE AND OBJECTIVE BOX
Patient is a 62 y/o with PMHx of DM, HTN, Diverticulosis, and Diverticulitis complicated by an abscess. Patient had Left Hemicolectomy with Anastamosis as well as loop with Ostomy. Patient feels well since the surgery and has no current complaints.  Patient denies any current fever, chills, headache, dizziness, change in vision/hearing/speech, cough, SOB, chest pain, palpitations, abdominal pain, nausea/emesis, flank pain, swelling of lower extremities, itchiness, or change in skin color.      Vital Signs:  T(F): 97   HR: 73  BP: 154/87  RR: 18  Physical Exam  Gen: NAD  HEENT: NC/AT  Cardio: S1/S2   Resp: CTA B/L  Abdomen: Soft, ND/NT, Ostomy mid abdomen with scan liquid brown output from bag  Neuro: AAOx3  Extremities: FROM x 4                                     11.6   8.03  )-----------( 357      ( 08 Apr 2019 01:00 )             35.9   04-08    138  |  102  |  10  ----------------------------<  183<H>  4.3   |  24  |  0.7    Ca    8.9      08 Apr 2019 01:00  Phos  3.3     04-08  Mg     1.8     04-08

## 2019-04-09 ENCOUNTER — TRANSCRIPTION ENCOUNTER (OUTPATIENT)
Age: 64
End: 2019-04-09

## 2019-04-09 VITALS
RESPIRATION RATE: 18 BRPM | HEART RATE: 75 BPM | TEMPERATURE: 98 F | DIASTOLIC BLOOD PRESSURE: 80 MMHG | SYSTOLIC BLOOD PRESSURE: 143 MMHG

## 2019-04-09 LAB
ANION GAP SERPL CALC-SCNC: 10 MMOL/L — SIGNIFICANT CHANGE UP (ref 7–14)
BASOPHILS # BLD AUTO: 0.03 K/UL — SIGNIFICANT CHANGE UP (ref 0–0.2)
BASOPHILS NFR BLD AUTO: 0.4 % — SIGNIFICANT CHANGE UP (ref 0–1)
BUN SERPL-MCNC: 14 MG/DL — SIGNIFICANT CHANGE UP (ref 10–20)
CALCIUM SERPL-MCNC: 8.8 MG/DL — SIGNIFICANT CHANGE UP (ref 8.5–10.1)
CHLORIDE SERPL-SCNC: 105 MMOL/L — SIGNIFICANT CHANGE UP (ref 98–110)
CO2 SERPL-SCNC: 23 MMOL/L — SIGNIFICANT CHANGE UP (ref 17–32)
CREAT SERPL-MCNC: 0.8 MG/DL — SIGNIFICANT CHANGE UP (ref 0.7–1.5)
EOSINOPHIL # BLD AUTO: 0.54 K/UL — SIGNIFICANT CHANGE UP (ref 0–0.7)
EOSINOPHIL NFR BLD AUTO: 7.2 % — SIGNIFICANT CHANGE UP (ref 0–8)
GLUCOSE SERPL-MCNC: 179 MG/DL — HIGH (ref 70–99)
HCT VFR BLD CALC: 34.2 % — LOW (ref 42–52)
HGB BLD-MCNC: 10.7 G/DL — LOW (ref 14–18)
IMM GRANULOCYTES NFR BLD AUTO: 0.5 % — HIGH (ref 0.1–0.3)
LYMPHOCYTES # BLD AUTO: 1.13 K/UL — LOW (ref 1.2–3.4)
LYMPHOCYTES # BLD AUTO: 15.1 % — LOW (ref 20.5–51.1)
MAGNESIUM SERPL-MCNC: 1.5 MG/DL — LOW (ref 1.8–2.4)
MCHC RBC-ENTMCNC: 27.3 PG — SIGNIFICANT CHANGE UP (ref 27–31)
MCHC RBC-ENTMCNC: 31.3 G/DL — LOW (ref 32–37)
MCV RBC AUTO: 87.2 FL — SIGNIFICANT CHANGE UP (ref 80–94)
MONOCYTES # BLD AUTO: 0.59 K/UL — SIGNIFICANT CHANGE UP (ref 0.1–0.6)
MONOCYTES NFR BLD AUTO: 7.9 % — SIGNIFICANT CHANGE UP (ref 1.7–9.3)
NEUTROPHILS # BLD AUTO: 5.16 K/UL — SIGNIFICANT CHANGE UP (ref 1.4–6.5)
NEUTROPHILS NFR BLD AUTO: 68.9 % — SIGNIFICANT CHANGE UP (ref 42.2–75.2)
NRBC # BLD: 0 /100 WBCS — SIGNIFICANT CHANGE UP (ref 0–0)
PHOSPHATE SERPL-MCNC: 3.3 MG/DL — SIGNIFICANT CHANGE UP (ref 2.1–4.9)
PLATELET # BLD AUTO: 306 K/UL — SIGNIFICANT CHANGE UP (ref 130–400)
POTASSIUM SERPL-MCNC: 4.4 MMOL/L — SIGNIFICANT CHANGE UP (ref 3.5–5)
POTASSIUM SERPL-SCNC: 4.4 MMOL/L — SIGNIFICANT CHANGE UP (ref 3.5–5)
RBC # BLD: 3.92 M/UL — LOW (ref 4.7–6.1)
RBC # FLD: 17.1 % — HIGH (ref 11.5–14.5)
SODIUM SERPL-SCNC: 138 MMOL/L — SIGNIFICANT CHANGE UP (ref 135–146)
WBC # BLD: 7.49 K/UL — SIGNIFICANT CHANGE UP (ref 4.8–10.8)
WBC # FLD AUTO: 7.49 K/UL — SIGNIFICANT CHANGE UP (ref 4.8–10.8)

## 2019-04-09 PROCEDURE — 99223 1ST HOSP IP/OBS HIGH 75: CPT

## 2019-04-09 RX ORDER — OXYCODONE HYDROCHLORIDE 5 MG/1
1 TABLET ORAL
Qty: 10 | Refills: 0 | OUTPATIENT
Start: 2019-04-09

## 2019-04-09 RX ORDER — CANAGLIFLOZIN AND METFORMIN HYDROCHLORIDE 50; 500 MG/1; MG/1
1 TABLET, FILM COATED, EXTENDED RELEASE ORAL
Qty: 0 | Refills: 0 | COMMUNITY

## 2019-04-09 RX ORDER — LOPERAMIDE HCL 2 MG
2 TABLET ORAL
Qty: 48 | Refills: 0
Start: 2019-04-09 | End: 2019-04-16

## 2019-04-09 RX ORDER — TAMSULOSIN HYDROCHLORIDE 0.4 MG/1
1 CAPSULE ORAL
Qty: 30 | Refills: 0
Start: 2019-04-09

## 2019-04-09 RX ORDER — NEBIVOLOL HYDROCHLORIDE 5 MG/1
1 TABLET ORAL
Qty: 0 | Refills: 0 | COMMUNITY

## 2019-04-09 RX ORDER — LOPERAMIDE HCL 2 MG
4 TABLET ORAL EVERY 6 HOURS
Qty: 0 | Refills: 0 | Status: DISCONTINUED | OUTPATIENT
Start: 2019-04-09 | End: 2019-04-09

## 2019-04-09 RX ORDER — SODIUM CHLORIDE 9 MG/ML
350 INJECTION INTRAMUSCULAR; INTRAVENOUS; SUBCUTANEOUS ONCE
Qty: 0 | Refills: 0 | Status: COMPLETED | OUTPATIENT
Start: 2019-04-09 | End: 2019-04-09

## 2019-04-09 RX ORDER — ASPIRIN/CALCIUM CARB/MAGNESIUM 324 MG
1 TABLET ORAL
Qty: 0 | Refills: 0 | COMMUNITY

## 2019-04-09 RX ADMIN — PANTOPRAZOLE SODIUM 40 MILLIGRAM(S): 20 TABLET, DELAYED RELEASE ORAL at 11:06

## 2019-04-09 RX ADMIN — HEPARIN SODIUM 5000 UNIT(S): 5000 INJECTION INTRAVENOUS; SUBCUTANEOUS at 06:06

## 2019-04-09 RX ADMIN — Medication 4 MILLIGRAM(S): at 06:20

## 2019-04-09 RX ADMIN — SODIUM CHLORIDE 700 MILLILITER(S): 9 INJECTION INTRAMUSCULAR; INTRAVENOUS; SUBCUTANEOUS at 03:10

## 2019-04-09 RX ADMIN — Medication 1: at 08:02

## 2019-04-09 RX ADMIN — Medication 4 MILLIGRAM(S): at 11:06

## 2019-04-09 NOTE — DISCHARGE NOTE PROVIDER - CARE PROVIDER_API CALL
Osiel Murdock)  ColonRectal Surgery; Surgery  [facility], 2nd Floor  [place], [place] [address]  Phone: [phone]  Fax: [phone]  Follow Up Time:

## 2019-04-09 NOTE — PROGRESS NOTE ADULT - SUBJECTIVE AND OBJECTIVE BOX
Progress Note: Trauma Surgery  Patient: ITA GORDON , 63y (10-Reynaldo-1955)Male   MRN: [id number]  Location: [address]  Visit: 04-03-19 Inpatient  Date: 04-09-19 @ 02:03  Hospital Day: 7  Post-op Day: 6    Procedure/Injury: s/p lap assisted left hemicolectomy with primary anastomosis   Events over 24h: No acute event overnight. No new complaint. Yesterday, ICDs were placed yesterday. Diet was avanced to regular and IV locked. GI saw pt yesterday and no further recommendations. Pt is vitally stable. Loperamide was increased to 4mg q12 hours and ileostomy has bee gradually decreasing. So far, 700 cc NS bolus has been given as replacement. On diet, tolerating well. Denies nausea, vomiting, and/or abdominal pain. Ambulating adequately. Using Incentive spirometer. Voiding adequately. +Flatus, +Bowel movement. Home care has been setup'ed.    Vitals: T(F): 97.2 (04-08-19 @ 23:52), Max: 97.4 (04-08-19 @ 07:38)  HR: 74 (04-08-19 @ 23:52)  BP: 128/67 (04-08-19 @ 23:52) (128/67 - 156/86)  RR: 18 (04-08-19 @ 23:52)      Diet: Diet, Regular:   Consistent Carbohydrate Evening Snack (04-08-19 @ 08:47)    IV Fluid: dextrose 5%. 1000 milliLiter(s) (50 mL/Hr) IV Continuous <Continuous>  sodium chloride 0.9% Bolus 350 milliLiter(s) IV Bolus once      In:   04-07-19 @ 07:01  -  04-08-19 @ 07:00  --------------------------------------------------------  IN: 1500 mL    04-08-19 @ 07:01  -  04-09-19 @ 02:03  --------------------------------------------------------  IN: 1343 mL      Out:   04-07-19 @ 07:01  -  04-08-19 @ 07:00  --------------------------------------------------------  OUT:    Ileostomy: 2575 mL    Voided: 2725 mL  Total OUT: 5300 mL      04-08-19 @ 07:01  -  04-09-19 @ 02:03  --------------------------------------------------------  OUT:    Ileostomy: 1400 mL    Voided: 300 mL  Total OUT: 1700 mL        Net:   04-07-19 @ 07:01  -  04-08-19 @ 07:00  --------------------------------------------------------  NET: -3800 mL    04-08-19 @ 07:01  -  04-09-19 @ 02:03  --------------------------------------------------------  NET: -357 mL        Physical Examination:  General: NAD, lying in bed comfortably   HEENT: NCAT, FARZANA, WNL  Heart: S1 S2, No MRG RRR   Lungs: CTABL +BS Equal BL, No WRC  Abdomen: Soft, non-distended, tender, ostomy in place   Incisions/Wounds: Dressings in place, clean, dry and intact, no signs of infection/active bleeding/drainage    Medications: [Standing]  dextrose 5%. 1000 milliLiter(s) (50 mL/Hr) IV Continuous <Continuous>  dextrose 50% Injectable 12.5 Gram(s) IV Push once  dextrose 50% Injectable 25 Gram(s) IV Push once  dextrose 50% Injectable 25 Gram(s) IV Push once  heparin  Injectable 5000 Unit(s) SubCutaneous every 8 hours  insulin lispro (HumaLOG) corrective regimen sliding scale   SubCutaneous three times a day before meals  ketorolac   Injectable 15 milliGRAM(s) IV Push once  loperamide 4 milliGRAM(s) Oral every 12 hours  nebivolol 10 milliGRAM(s) Oral daily  pantoprazole  Injectable 40 milliGRAM(s) IV Push daily  sodium chloride 0.9% Bolus 350 milliLiter(s) IV Bolus once  tamsulosin 0.4 milliGRAM(s) Oral at bedtime    Medications:[PRN]  acetaminophen   Tablet .. 650 milliGRAM(s) Oral every 6 hours PRN  dextrose 40% Gel 15 Gram(s) Oral once PRN  glucagon  Injectable 1 milliGRAM(s) IntraMuscular once PRN  morphine  - Injectable 2 milliGRAM(s) IV Push every 6 hours PRN  naloxone Injectable 0.1 milliGRAM(s) IV Push every 3 minutes PRN  ondansetron Injectable 4 milliGRAM(s) IV Push every 6 hours PRN  ondansetron Injectable 4 milliGRAM(s) IV Push every 6 hours PRN  oxyCODONE    IR 5 milliGRAM(s) Oral every 6 hours PRN    Labs:                        11.6   8.03  )-----------( 357      ( 08 Apr 2019 01:00 )             35.9   04-08    138  |  102  |  10  ----------------------------<  183<H>  4.3   |  24  |  0.7    Ca    8.9      08 Apr 2019 01:00  Phos  3.3     04-08  Mg     1.8     04-08        Micro/Urine:    Imaging:  None/24h

## 2019-04-09 NOTE — DISCHARGE NOTE PROVIDER - HOSPITAL COURSE
63M with a PMH of DM, HTN, Diverticulosis, and Chronic Diarrhea presented from home with a larry-colonic abscess and underwent laparoscopic assisted         Patient denied fever, chills, recent sick contacts, travel, n/v, history of colitis, aggravating or alleviating factors of diarrhea, CP, SOB, dizziness, LOC. Patient has been maintaining PO intake. No family history of GI issues or cancers 63M with a PMH of DM, HTN, Diverticulosis, and Chronic Diarrhea presented from home with a larry-colonic abscess and underwent laparoscopic assisted sigmoid colectomy and diverting ileostomy. He tolerated the procedure very well. On POD 1 he was advanced to clears and eventually to a full liquid diet but he began to become distended and was not passing any gas or stool into his ostomy so he was made NPO and encouraged to ambulated by POD 3 he began to pass gas and large amounts of liquid into his ostomy beg. Eventually loperamide was added to his medications to attempt to decrease the ostomy output. it began to work and on POD 6 he was ambulating and voiding well, his ostomy began to have thicker output and his pain was much better controlled. He is now medically cleared for discharge and will follow up with Dr. Murdock as an outpatient in 1 week.

## 2019-04-09 NOTE — PROGRESS NOTE ADULT - ASSESSMENT
Assessment:  63y Male patient admitted S/P lap assisted left hemicolectomy with primary anastomosis, with the above physical exam, labs, and imaging findings.    Plan:  -F/U ileostomy outputs and replacement with 1:0.5 NS boluses  -Pain control as needed  -Hemodynamic monitoring as per routine  -Encourage ambulation and incentive spirometer use (10x/hr when awake)  -GI and DVT prophylaxis  -Check and replete CBC and BMP q daily  -Strict input and output monitoring  -Continue current management  -Can be D/C to home today. VNS is set-up'ed.    Date/Time: 04-09-19 @ 02:03 Assessment:  63y Male patient admitted S/P lap assisted left hemicolectomy with primary anastomosis, with the above physical exam, labs, and imaging findings.    Plan:  -F/U ileostomy outputs and replacement with 1:0.5 NS boluses  -Pain control as needed  -Pt is intended for today  -C/W loperamide  -Hemodynamic monitoring as per routine  -Encourage ambulation and incentive spirometer use (10x/hr when awake)  -GI and DVT prophylaxis  -Check and replete CBC and BMP q daily  -Strict input and output monitoring  -Continue current management  -Can be D/C to home today. VNS is set-up'ed.    Date/Time: 04-09-19 @ 02:03

## 2019-04-09 NOTE — DISCHARGE NOTE PROVIDER - NSDCCPCAREPLAN_GEN_ALL_CORE_FT
PRINCIPAL DISCHARGE DIAGNOSIS  Diagnosis: Colonic diverticular abscess  Assessment and Plan of Treatment:

## 2019-04-09 NOTE — PROGRESS NOTE ADULT - SUBJECTIVE AND OBJECTIVE BOX
Patient is a 62 y/o with PMHx of DM, HTN, Diverticulosis, and Diverticulitis complicated by an abscess. Patient had Left Hemicolectomy with Anastamosis as well as loop with Ileostomy over right abdomen . Patient feels well since the surgery and has no current complaints. No overnight events. Patient denies any current fever, chills, headache, dizziness, change in vision/hearing/speech, cough, SOB, chest pain, palpitations, abdominal pain, nausea/emesis, flank pain, swelling of lower extremities, itchiness, or change in skin color.      Vital Signs:  T(F): 97.2   HR: 75  BP: 134/81  RR: 18   Physical Exam  Gen: NAD  HEENT: NC/AT  Cardio: S1/S2   Resp: CTA B/L  Abdomen: Soft, ND/NT, Ostomy mid abdomen with scan liquid brown output from bag  Neuro: AAOx3  Extremities: FROM x 4                                     11.6   8.03  )-----------( 357      ( 08 Apr 2019 01:00 )             35.9   04-08    138  |  102  |  10  ----------------------------<  183<H>  4.3   |  24  |  0.7    Ca    8.9      08 Apr 2019 01:00  Phos  3.3     04-08  Mg     1.8     04-08

## 2019-04-09 NOTE — DISCHARGE NOTE PROVIDER - NSDCFUADDINST_GEN_ALL_CORE_FT
For mild to moderate pain please take Tylenol 650 mg alternating with Motrin 600 mg every 6 hours. You should take these with meals.    For severe pain you take oxycodone 5 mg every 6 hours as needed. If you do not need this medication do not take it.     Do not anything heavy over 10-15 lbs for 4-6 weeks.     Please follow up in clinic with Dr. Murdock within 1 week

## 2019-04-09 NOTE — DISCHARGE NOTE PROVIDER - INSTRUCTIONS
Please make sure you are getting an adequate amount of water each day at least 1L. If you do not keep up with enough water you may become dehydrated due to the amount of liquid that can be lost from the ileostomy each day

## 2019-04-09 NOTE — PROGRESS NOTE ADULT - ASSESSMENT
Patient is a 62 y/o with PMHx of DM, HTN, Diverticulosis, and Diverticulitis complicated by an abscess. Patient had Left Hemicolectomy with Anastomosis as well as loop with Ostomy. Patient feels well since the surgery and has no current complaints. Surgical site well healed and loop, Ileostomy site appears well.    Diverticulitis complicated by abscess formation, post surgical resection and Anastomosis   - Clinically well appearing  - Plan as per surgical team  - Patient is knowledgeable regarding output and how to balance intake  - Follow up with GI non urgent on discharge

## 2019-04-09 NOTE — ADVANCED PRACTICE NURSE CONSULT - RECOMMEDATIONS
Patient and his wife are appropriate and receptive to education.  Engaged in care and available resources.  Literature and supplies provided for discharge.

## 2019-04-12 DIAGNOSIS — E11.9 TYPE 2 DIABETES MELLITUS WITHOUT COMPLICATIONS: ICD-10-CM

## 2019-04-12 DIAGNOSIS — K57.32 DIVERTICULITIS OF LARGE INTESTINE WITHOUT PERFORATION OR ABSCESS WITHOUT BLEEDING: ICD-10-CM

## 2019-04-12 DIAGNOSIS — K57.20 DIVERTICULITIS OF LARGE INTESTINE WITH PERFORATION AND ABSCESS WITHOUT BLEEDING: ICD-10-CM

## 2019-04-12 DIAGNOSIS — K56.7 ILEUS, UNSPECIFIED: ICD-10-CM

## 2019-04-12 DIAGNOSIS — I10 ESSENTIAL (PRIMARY) HYPERTENSION: ICD-10-CM

## 2019-04-12 DIAGNOSIS — Z79.84 LONG TERM (CURRENT) USE OF ORAL HYPOGLYCEMIC DRUGS: ICD-10-CM

## 2019-04-12 DIAGNOSIS — R00.0 TACHYCARDIA, UNSPECIFIED: ICD-10-CM

## 2019-04-16 ENCOUNTER — APPOINTMENT (OUTPATIENT)
Dept: COLORECTAL SURGERY | Facility: CLINIC | Age: 64
End: 2019-04-16
Payer: COMMERCIAL

## 2019-04-16 VITALS
TEMPERATURE: 97.5 F | BODY MASS INDEX: 28.49 KG/M2 | HEIGHT: 68 IN | WEIGHT: 188 LBS | SYSTOLIC BLOOD PRESSURE: 158 MMHG | DIASTOLIC BLOOD PRESSURE: 69 MMHG | HEART RATE: 89 BPM

## 2019-04-16 PROCEDURE — 99212 OFFICE O/P EST SF 10 MIN: CPT

## 2019-04-18 NOTE — ASSESSMENT
[FreeTextEntry1] : Exam findings were discussed at length with patient and wife, Patient reassured\par Continue to liberalize diet\par Continue stoma care\par Limit strenuous activity for 6-8 weeks post procedure\par F/u Dr Murdock 1 month or sooner as needed\par All questions were answered, patient expressed understanding, and is agreeable to this plan.\par \par \par

## 2019-04-18 NOTE — PHYSICAL EXAM
[FreeTextEntry1] : Gen NAD, AOx4\par Well nourished\par Comfortable in chair\par Nonlabored breathing\par Ambulating without assistance\par Abdomen soft ND NT, right sided ileostomy pink and everted with bilious output, stoma appliance with good seal, periumbilical and lower midline wound well healing, staples removed, no incisional erythema or fluctuance, no hernias noted, laparoscopic port sites well healing all other ROS negative except as per HPI

## 2019-04-18 NOTE — HISTORY OF PRESENT ILLNESS
[FreeTextEntry1] : 64 y/o M presents s/p sigmoidectomy with diverting ileostomy on 4/3/19 with Dr. Murdock at [facility]\par \par Final Diagnosis\par 1. Sigmoid rectum:\par - Diverticulosis coli with multiple mural abscesses and mural\par fibrosis/scarring with chronic inflammation and histiocytic\par reaction (changes compatible with healing abscesses and/or\par possible foci of perforation).\par - Multiple serosal adhesions are identified.\par \par 2. Descending colon:\par - Segment of colon with foci of diverticulosis coli, mild\par vascular congestion and serosal adhesions.\par \par 3. Colonic donuts:\par - Two short segments of colonic wall without diagnostic change.\par \par Reports mild abdominal discomfort but denies pain, used Tylenol PRN initially after d/c, no longer using\par Appetite and energy level slowly returning to normal. Repots ~10 lbs weight loss since surgery, eating 3 meals daily but states meals are smaller. Has not worked fruits or vegetables back in to diet yet but has tried oatmeal without issue\par Denies N,V, fever or chills. Patient and wife concerned of slight infection at surgical incision d/t mild erythema. No signs of drainage or swelling, not hot to touch\par Staples intact, incisions healing well\par BH; 6-7 outputs daily, watery to paste like depending of diet\par VNS coming several times weekly\par \par \par \par \par  \par \par

## 2019-04-18 NOTE — CONSULT LETTER
[Dear  ___] : Dear  [unfilled], [Consult Letter:] : I had the pleasure of evaluating your patient, [unfilled]. [( Thank you for referring [unfilled] for consultation for _____ )] : Thank you for referring [unfilled] for consultation for [unfilled] [Please see my note below.] : Please see my note below. [Consult Closing:] : Thank you very much for allowing me to participate in the care of this patient.  If you have any questions, please do not hesitate to contact me. [Sincerely,] : Sincerely, [FreeTextEntry2] : MATIAS HANLEY\par [address]\[place] [address] [FreeTextEntry3] : FANG RAMOS MD

## 2019-05-01 ENCOUNTER — OTHER (OUTPATIENT)
Age: 64
End: 2019-05-01

## 2019-05-01 ENCOUNTER — APPOINTMENT (OUTPATIENT)
Dept: COLORECTAL SURGERY | Facility: CLINIC | Age: 64
End: 2019-05-01

## 2019-05-01 VITALS
BODY MASS INDEX: 29.7 KG/M2 | WEIGHT: 196 LBS | SYSTOLIC BLOOD PRESSURE: 148 MMHG | HEIGHT: 68 IN | DIASTOLIC BLOOD PRESSURE: 70 MMHG

## 2019-05-01 NOTE — ASSESSMENT
[FreeTextEntry1] : Advised Follow up in 4 weeks for flexible sigmoidoscopy and Gastrografin enema\par \par WIll plan for ileostomy closure pending results.\par

## 2019-05-01 NOTE — PHYSICAL EXAM
[Abdomen Masses] : No abdominal masses [JVD] : no jugular venous distention  [de-identified] : diastasis of rectus muscle, ileostomy functional, wound well healed. [No Rash or Lesion] : No rash or lesion [Normal Breath Sounds] : Normal breath sounds

## 2019-05-01 NOTE — HISTORY OF PRESENT ILLNESS
[FreeTextEntry1] : s/p lap assisted sigmoid resection/ileostomy creation for complex perforated diverticulitis.\par 4/3/19.\par \par Continues to recover well.\par \par Excellent appetite and energy.\par Stoma functioning well.\par

## 2019-06-07 ENCOUNTER — APPOINTMENT (OUTPATIENT)
Dept: COLORECTAL SURGERY | Facility: CLINIC | Age: 64
End: 2019-06-07
Payer: COMMERCIAL

## 2019-06-07 ENCOUNTER — TRANSCRIPTION ENCOUNTER (OUTPATIENT)
Age: 64
End: 2019-06-07

## 2019-06-07 VITALS
WEIGHT: 204 LBS | BODY MASS INDEX: 30.92 KG/M2 | HEIGHT: 68 IN | HEART RATE: 55 BPM | DIASTOLIC BLOOD PRESSURE: 87 MMHG | TEMPERATURE: 98.6 F | SYSTOLIC BLOOD PRESSURE: 145 MMHG

## 2019-06-07 PROCEDURE — 99024 POSTOP FOLLOW-UP VISIT: CPT

## 2019-06-07 PROCEDURE — 45330 DIAGNOSTIC SIGMOIDOSCOPY: CPT | Mod: 79

## 2019-06-07 NOTE — HISTORY OF PRESENT ILLNESS
[FreeTextEntry1] : 62 yo M presents for f/u and discussion for ileostomy reversal\par s/p lap assisted sigmoid resection/ileostomy creation for complex perforated diverticulitis.\par 4/3/19\par \par Gastrografin enema 06/2019:\par Patent distal colon anastomosis w/o stricture, perforation or leak. Proximal diverticulosis\par \par Appetite good\par Reports a bit of mucousy discharge after gastrograffin\par Denies pain, fever, abd pain, bloating\par Reports his PCP advised he's dehydrated, has been drinking over 80 oz and started an electrolyte supplement\par Paying for own ostomy appliance to prevent skin irritation w/ improvement\par Reports lower portion of stoma "sticking out" since last week. Denies pain at present\par Empties bag 12+ times when full, typically watery consistency, occasionally pasty\par No use of immodium\par Changes ostomy every 3-4 days

## 2019-06-07 NOTE — PHYSICAL EXAM
[Normal] : was normal [Abdomen Masses] : No abdominal masses [Normal Breath Sounds] : Normal breath sounds [JVD] : no jugular venous distention  [de-identified] : diastasis of rectus muscle, ileostomy functional, incisions well healed. [No Rash or Lesion] : No rash or lesion [FreeTextEntry1] : The risks , benefits and alternatives of the procedure were reviewed with the patient. The patient consents to the planned procedure.\par \par The flexible sigmoidoscope was passed through the anus into the rectum. The scope was passed to 15    cm from the anal verge.\par \par The findings revealed:\par \par There was evidence of a anastomotic web at the patent anastomosis.

## 2019-06-07 NOTE — ASSESSMENT
[FreeTextEntry1] : I advised the patient that I will refer him for a balloon dilation of the anastomotic web prior to closure of ileostomy.\par \par I had extensive discussion with the patient (45 minutes) regarding the diagnosis and treatment options. I recommended that he consider proceeding with a closure of the ileostomy\par \par The associated risks, benefits, alternatives of the procedure have been outlined discussed and reviewed with the patient's family. These risks including but not limited to bleeding, infection, anastomotic leak, need for secondary surgery, need for ileostomy or colostomy creation, change in bowel habits, c as well as the risk of heart and lung complications infection and death were detailed. The patient understands these risks and consents the planned procedure. Appropriate  literature regarding surgery and post operative treatment/complications and enhanced recovery pathway has been detailed and reviewed. Consent was obtained. All questions were answered.\par

## 2019-06-19 VITALS
WEIGHT: 204.15 LBS | DIASTOLIC BLOOD PRESSURE: 70 MMHG | HEIGHT: 68 IN | RESPIRATION RATE: 16 BRPM | SYSTOLIC BLOOD PRESSURE: 126 MMHG | TEMPERATURE: 98 F | HEART RATE: 85 BPM | OXYGEN SATURATION: 96 %

## 2019-06-20 ENCOUNTER — INPATIENT (INPATIENT)
Facility: HOSPITAL | Age: 64
LOS: 2 days | Discharge: HOME | DRG: 331 | End: 2019-06-23
Attending: SURGERY | Admitting: SURGERY
Payer: COMMERCIAL

## 2019-06-20 ENCOUNTER — APPOINTMENT (OUTPATIENT)
Dept: COLORECTAL SURGERY | Facility: HOSPITAL | Age: 64
End: 2019-06-20

## 2019-06-20 ENCOUNTER — RESULT REVIEW (OUTPATIENT)
Age: 64
End: 2019-06-20

## 2019-06-20 DIAGNOSIS — Z98.890 OTHER SPECIFIED POSTPROCEDURAL STATES: Chronic | ICD-10-CM

## 2019-06-20 DIAGNOSIS — Z98.890 OTHER SPECIFIED POSTPROCEDURAL STATES: ICD-10-CM

## 2019-06-20 LAB
GLUCOSE BLDC GLUCOMTR-MCNC: 143 MG/DL — HIGH (ref 70–99)
GLUCOSE BLDC GLUCOMTR-MCNC: 171 MG/DL — HIGH (ref 70–99)
GLUCOSE BLDC GLUCOMTR-MCNC: 187 MG/DL — HIGH (ref 70–99)
GLUCOSE BLDC GLUCOMTR-MCNC: 214 MG/DL — HIGH (ref 70–99)

## 2019-06-20 PROCEDURE — 44625 REPAIR BOWEL OPENING: CPT | Mod: GC

## 2019-06-20 RX ORDER — NEBIVOLOL HYDROCHLORIDE 5 MG/1
10 TABLET ORAL AT BEDTIME
Refills: 0 | Status: DISCONTINUED | OUTPATIENT
Start: 2019-06-20 | End: 2019-06-23

## 2019-06-20 RX ORDER — KETOROLAC TROMETHAMINE 30 MG/ML
15 SYRINGE (ML) INJECTION EVERY 6 HOURS
Refills: 0 | Status: DISCONTINUED | OUTPATIENT
Start: 2019-06-20 | End: 2019-06-23

## 2019-06-20 RX ORDER — DEXTROSE 50 % IN WATER 50 %
25 SYRINGE (ML) INTRAVENOUS ONCE
Refills: 0 | Status: DISCONTINUED | OUTPATIENT
Start: 2019-06-20 | End: 2019-06-23

## 2019-06-20 RX ORDER — ONDANSETRON 8 MG/1
4 TABLET, FILM COATED ORAL EVERY 6 HOURS
Refills: 0 | Status: DISCONTINUED | OUTPATIENT
Start: 2019-06-20 | End: 2019-06-23

## 2019-06-20 RX ORDER — HYDROMORPHONE HYDROCHLORIDE 2 MG/ML
0.5 INJECTION INTRAMUSCULAR; INTRAVENOUS; SUBCUTANEOUS EVERY 4 HOURS
Refills: 0 | Status: DISCONTINUED | OUTPATIENT
Start: 2019-06-20 | End: 2019-06-23

## 2019-06-20 RX ORDER — DEXTROSE 50 % IN WATER 50 %
15 SYRINGE (ML) INTRAVENOUS ONCE
Refills: 0 | Status: DISCONTINUED | OUTPATIENT
Start: 2019-06-20 | End: 2019-06-23

## 2019-06-20 RX ORDER — GLUCAGON INJECTION, SOLUTION 0.5 MG/.1ML
1 INJECTION, SOLUTION SUBCUTANEOUS ONCE
Refills: 0 | Status: DISCONTINUED | OUTPATIENT
Start: 2019-06-20 | End: 2019-06-23

## 2019-06-20 RX ORDER — GABAPENTIN 400 MG/1
600 CAPSULE ORAL ONCE
Refills: 0 | Status: COMPLETED | OUTPATIENT
Start: 2019-06-20 | End: 2019-06-20

## 2019-06-20 RX ORDER — NEBIVOLOL HYDROCHLORIDE 5 MG/1
1 TABLET ORAL
Qty: 0 | Refills: 0 | DISCHARGE

## 2019-06-20 RX ORDER — CANAGLIFLOZIN AND METFORMIN HYDROCHLORIDE 50; 500 MG/1; MG/1
1 TABLET, FILM COATED, EXTENDED RELEASE ORAL
Qty: 0 | Refills: 0 | DISCHARGE

## 2019-06-20 RX ORDER — ACETAMINOPHEN 500 MG
1000 TABLET ORAL ONCE
Refills: 0 | Status: COMPLETED | OUTPATIENT
Start: 2019-06-20 | End: 2019-06-20

## 2019-06-20 RX ORDER — HEPARIN SODIUM 5000 [USP'U]/ML
5000 INJECTION INTRAVENOUS; SUBCUTANEOUS EVERY 8 HOURS
Refills: 0 | Status: DISCONTINUED | OUTPATIENT
Start: 2019-06-20 | End: 2019-06-23

## 2019-06-20 RX ORDER — DEXTROSE 50 % IN WATER 50 %
12.5 SYRINGE (ML) INTRAVENOUS ONCE
Refills: 0 | Status: DISCONTINUED | OUTPATIENT
Start: 2019-06-20 | End: 2019-06-23

## 2019-06-20 RX ORDER — HYDROMORPHONE HYDROCHLORIDE 2 MG/ML
0.5 INJECTION INTRAMUSCULAR; INTRAVENOUS; SUBCUTANEOUS ONCE
Refills: 0 | Status: COMPLETED | OUTPATIENT
Start: 2019-06-20 | End: 2019-06-20

## 2019-06-20 RX ORDER — SODIUM CHLORIDE 9 MG/ML
1000 INJECTION, SOLUTION INTRAVENOUS
Refills: 0 | Status: DISCONTINUED | OUTPATIENT
Start: 2019-06-20 | End: 2019-06-21

## 2019-06-20 RX ORDER — INSULIN LISPRO 100/ML
VIAL (ML) SUBCUTANEOUS
Refills: 0 | Status: DISCONTINUED | OUTPATIENT
Start: 2019-06-20 | End: 2019-06-23

## 2019-06-20 RX ORDER — ACETAMINOPHEN 500 MG
1000 TABLET ORAL EVERY 6 HOURS
Refills: 0 | Status: DISCONTINUED | OUTPATIENT
Start: 2019-06-20 | End: 2019-06-23

## 2019-06-20 RX ORDER — SODIUM CHLORIDE 9 MG/ML
1000 INJECTION, SOLUTION INTRAVENOUS
Refills: 0 | Status: DISCONTINUED | OUTPATIENT
Start: 2019-06-20 | End: 2019-06-23

## 2019-06-20 RX ORDER — HYDROMORPHONE HYDROCHLORIDE 2 MG/ML
0.5 INJECTION INTRAMUSCULAR; INTRAVENOUS; SUBCUTANEOUS
Refills: 0 | Status: DISCONTINUED | OUTPATIENT
Start: 2019-06-20 | End: 2019-06-20

## 2019-06-20 RX ORDER — HEPARIN SODIUM 5000 [USP'U]/ML
5000 INJECTION INTRAVENOUS; SUBCUTANEOUS ONCE
Refills: 0 | Status: COMPLETED | OUTPATIENT
Start: 2019-06-20 | End: 2019-06-20

## 2019-06-20 RX ADMIN — Medication 1000 MILLIGRAM(S): at 20:59

## 2019-06-20 RX ADMIN — HYDROMORPHONE HYDROCHLORIDE 0.5 MILLIGRAM(S): 2 INJECTION INTRAMUSCULAR; INTRAVENOUS; SUBCUTANEOUS at 15:35

## 2019-06-20 RX ADMIN — GABAPENTIN 600 MILLIGRAM(S): 400 CAPSULE ORAL at 08:54

## 2019-06-20 RX ADMIN — HYDROMORPHONE HYDROCHLORIDE 0.5 MILLIGRAM(S): 2 INJECTION INTRAMUSCULAR; INTRAVENOUS; SUBCUTANEOUS at 12:40

## 2019-06-20 RX ADMIN — Medication 1000 MILLIGRAM(S): at 08:53

## 2019-06-20 RX ADMIN — HYDROMORPHONE HYDROCHLORIDE 0.5 MILLIGRAM(S): 2 INJECTION INTRAMUSCULAR; INTRAVENOUS; SUBCUTANEOUS at 12:26

## 2019-06-20 RX ADMIN — Medication 2: at 11:35

## 2019-06-20 RX ADMIN — Medication 15 MILLIGRAM(S): at 18:15

## 2019-06-20 RX ADMIN — HEPARIN SODIUM 5000 UNIT(S): 5000 INJECTION INTRAVENOUS; SUBCUTANEOUS at 16:40

## 2019-06-20 RX ADMIN — Medication 15 MILLIGRAM(S): at 17:45

## 2019-06-20 RX ADMIN — HYDROMORPHONE HYDROCHLORIDE 0.5 MILLIGRAM(S): 2 INJECTION INTRAMUSCULAR; INTRAVENOUS; SUBCUTANEOUS at 22:10

## 2019-06-20 RX ADMIN — ONDANSETRON 4 MILLIGRAM(S): 8 TABLET, FILM COATED ORAL at 21:51

## 2019-06-20 RX ADMIN — Medication 2: at 21:51

## 2019-06-20 RX ADMIN — Medication 15 MILLIGRAM(S): at 12:15

## 2019-06-20 RX ADMIN — HYDROMORPHONE HYDROCHLORIDE 0.5 MILLIGRAM(S): 2 INJECTION INTRAMUSCULAR; INTRAVENOUS; SUBCUTANEOUS at 11:26

## 2019-06-20 RX ADMIN — Medication 4: at 17:40

## 2019-06-20 RX ADMIN — Medication 15 MILLIGRAM(S): at 11:49

## 2019-06-20 RX ADMIN — Medication 1000 MILLIGRAM(S): at 19:59

## 2019-06-20 RX ADMIN — HYDROMORPHONE HYDROCHLORIDE 0.5 MILLIGRAM(S): 2 INJECTION INTRAMUSCULAR; INTRAVENOUS; SUBCUTANEOUS at 21:51

## 2019-06-20 RX ADMIN — Medication 1000 MILLIGRAM(S): at 14:43

## 2019-06-20 RX ADMIN — NEBIVOLOL HYDROCHLORIDE 10 MILLIGRAM(S): 5 TABLET ORAL at 22:33

## 2019-06-20 RX ADMIN — HYDROMORPHONE HYDROCHLORIDE 0.5 MILLIGRAM(S): 2 INJECTION INTRAMUSCULAR; INTRAVENOUS; SUBCUTANEOUS at 16:05

## 2019-06-20 RX ADMIN — Medication 1000 MILLIGRAM(S): at 15:13

## 2019-06-20 RX ADMIN — HEPARIN SODIUM 5000 UNIT(S): 5000 INJECTION INTRAVENOUS; SUBCUTANEOUS at 08:53

## 2019-06-20 RX ADMIN — HYDROMORPHONE HYDROCHLORIDE 0.5 MILLIGRAM(S): 2 INJECTION INTRAMUSCULAR; INTRAVENOUS; SUBCUTANEOUS at 11:47

## 2019-06-20 NOTE — BRIEF OPERATIVE NOTE - OPERATION/FINDINGS
Preop Dx: History of loop ileostomy  Postop Dx: Same as above  Procedure: Reversal of loop ileostomy  Findings: Circumferential incision made. ileostomy  from surrounding tissues. A side to side functional end to end anastomosis was created with 2x fires of the EndoGIA purple load stapler. Common enterotomy closed with MICHEAL 75mm blue load stapler. Hemostasis was obtained. Wound packed with betadine soaked gauze.

## 2019-06-20 NOTE — H&P ADULT - NSHPPHYSICALEXAM_GEN_ALL_CORE
Gen: NAD  CV: RRR  Pulm: Regular nonlabored Respirations  Abd: Soft, NT, ND. Ileostomy pink and with output.

## 2019-06-20 NOTE — H&P ADULT - HISTORY OF PRESENT ILLNESS
64M hx HTN, DM, complex diverticulitis s/p sigmoid resection, primary anastomosis and diverting loop ileostomy on 4/3/2019. Here for loop ileostomy reversal. Pt reports no issues with ileostomy. No f/c/n/v.

## 2019-06-21 LAB
ANION GAP SERPL CALC-SCNC: 12 MMOL/L — SIGNIFICANT CHANGE UP (ref 5–17)
BUN SERPL-MCNC: 26 MG/DL — HIGH (ref 7–23)
CALCIUM SERPL-MCNC: 9.4 MG/DL — SIGNIFICANT CHANGE UP (ref 8.4–10.5)
CHLORIDE SERPL-SCNC: 102 MMOL/L — SIGNIFICANT CHANGE UP (ref 96–108)
CO2 SERPL-SCNC: 20 MMOL/L — LOW (ref 22–31)
CREAT SERPL-MCNC: 1.29 MG/DL — SIGNIFICANT CHANGE UP (ref 0.5–1.3)
GLUCOSE BLDC GLUCOMTR-MCNC: 137 MG/DL — HIGH (ref 70–99)
GLUCOSE BLDC GLUCOMTR-MCNC: 160 MG/DL — HIGH (ref 70–99)
GLUCOSE BLDC GLUCOMTR-MCNC: 173 MG/DL — HIGH (ref 70–99)
GLUCOSE SERPL-MCNC: 153 MG/DL — HIGH (ref 70–99)
HCT VFR BLD CALC: 41.1 % — SIGNIFICANT CHANGE UP (ref 39–50)
HGB BLD-MCNC: 13.3 G/DL — SIGNIFICANT CHANGE UP (ref 13–17)
MAGNESIUM SERPL-MCNC: 1.7 MG/DL — SIGNIFICANT CHANGE UP (ref 1.6–2.6)
MCHC RBC-ENTMCNC: 29.4 PG — SIGNIFICANT CHANGE UP (ref 27–34)
MCHC RBC-ENTMCNC: 32.4 GM/DL — SIGNIFICANT CHANGE UP (ref 32–36)
MCV RBC AUTO: 90.7 FL — SIGNIFICANT CHANGE UP (ref 80–100)
NRBC # BLD: 0 /100 WBCS — SIGNIFICANT CHANGE UP (ref 0–0)
PHOSPHATE SERPL-MCNC: 3.8 MG/DL — SIGNIFICANT CHANGE UP (ref 2.5–4.5)
PLATELET # BLD AUTO: 237 K/UL — SIGNIFICANT CHANGE UP (ref 150–400)
POTASSIUM SERPL-MCNC: 4.2 MMOL/L — SIGNIFICANT CHANGE UP (ref 3.5–5.3)
POTASSIUM SERPL-SCNC: 4.2 MMOL/L — SIGNIFICANT CHANGE UP (ref 3.5–5.3)
RBC # BLD: 4.53 M/UL — SIGNIFICANT CHANGE UP (ref 4.2–5.8)
RBC # FLD: 16 % — HIGH (ref 10.3–14.5)
SODIUM SERPL-SCNC: 134 MMOL/L — LOW (ref 135–145)
WBC # BLD: 7.92 K/UL — SIGNIFICANT CHANGE UP (ref 3.8–10.5)
WBC # FLD AUTO: 7.92 K/UL — SIGNIFICANT CHANGE UP (ref 3.8–10.5)

## 2019-06-21 RX ADMIN — Medication 1000 MILLIGRAM(S): at 08:43

## 2019-06-21 RX ADMIN — Medication 15 MILLIGRAM(S): at 05:00

## 2019-06-21 RX ADMIN — Medication 2: at 11:26

## 2019-06-21 RX ADMIN — NEBIVOLOL HYDROCHLORIDE 10 MILLIGRAM(S): 5 TABLET ORAL at 21:11

## 2019-06-21 RX ADMIN — HEPARIN SODIUM 5000 UNIT(S): 5000 INJECTION INTRAVENOUS; SUBCUTANEOUS at 08:13

## 2019-06-21 RX ADMIN — Medication 15 MILLIGRAM(S): at 00:02

## 2019-06-21 RX ADMIN — HEPARIN SODIUM 5000 UNIT(S): 5000 INJECTION INTRAVENOUS; SUBCUTANEOUS at 16:59

## 2019-06-21 RX ADMIN — Medication 15 MILLIGRAM(S): at 11:18

## 2019-06-21 RX ADMIN — Medication 15 MILLIGRAM(S): at 19:01

## 2019-06-21 RX ADMIN — Medication 2: at 08:13

## 2019-06-21 RX ADMIN — Medication 15 MILLIGRAM(S): at 11:48

## 2019-06-21 RX ADMIN — Medication 1000 MILLIGRAM(S): at 08:13

## 2019-06-21 RX ADMIN — Medication 1000 MILLIGRAM(S): at 14:41

## 2019-06-21 RX ADMIN — HEPARIN SODIUM 5000 UNIT(S): 5000 INJECTION INTRAVENOUS; SUBCUTANEOUS at 00:02

## 2019-06-21 RX ADMIN — Medication 15 MILLIGRAM(S): at 05:30

## 2019-06-21 RX ADMIN — Medication 1000 MILLIGRAM(S): at 19:45

## 2019-06-21 RX ADMIN — Medication 15 MILLIGRAM(S): at 18:31

## 2019-06-21 RX ADMIN — Medication 15 MILLIGRAM(S): at 00:32

## 2019-06-21 RX ADMIN — Medication 1000 MILLIGRAM(S): at 15:12

## 2019-06-22 LAB
ANION GAP SERPL CALC-SCNC: 12 MMOL/L — SIGNIFICANT CHANGE UP (ref 5–17)
BUN SERPL-MCNC: 19 MG/DL — SIGNIFICANT CHANGE UP (ref 7–23)
CALCIUM SERPL-MCNC: 9.3 MG/DL — SIGNIFICANT CHANGE UP (ref 8.4–10.5)
CHLORIDE SERPL-SCNC: 107 MMOL/L — SIGNIFICANT CHANGE UP (ref 96–108)
CO2 SERPL-SCNC: 21 MMOL/L — LOW (ref 22–31)
CREAT SERPL-MCNC: 1.02 MG/DL — SIGNIFICANT CHANGE UP (ref 0.5–1.3)
GLUCOSE SERPL-MCNC: 119 MG/DL — HIGH (ref 70–99)
HCT VFR BLD CALC: 41.6 % — SIGNIFICANT CHANGE UP (ref 39–50)
HGB BLD-MCNC: 13.5 G/DL — SIGNIFICANT CHANGE UP (ref 13–17)
MAGNESIUM SERPL-MCNC: 1.7 MG/DL — SIGNIFICANT CHANGE UP (ref 1.6–2.6)
MCHC RBC-ENTMCNC: 30 PG — SIGNIFICANT CHANGE UP (ref 27–34)
MCHC RBC-ENTMCNC: 32.5 GM/DL — SIGNIFICANT CHANGE UP (ref 32–36)
MCV RBC AUTO: 92.4 FL — SIGNIFICANT CHANGE UP (ref 80–100)
NRBC # BLD: 0 /100 WBCS — SIGNIFICANT CHANGE UP (ref 0–0)
PHOSPHATE SERPL-MCNC: 3.3 MG/DL — SIGNIFICANT CHANGE UP (ref 2.5–4.5)
PLATELET # BLD AUTO: 227 K/UL — SIGNIFICANT CHANGE UP (ref 150–400)
POTASSIUM SERPL-MCNC: 4.1 MMOL/L — SIGNIFICANT CHANGE UP (ref 3.5–5.3)
POTASSIUM SERPL-SCNC: 4.1 MMOL/L — SIGNIFICANT CHANGE UP (ref 3.5–5.3)
RBC # BLD: 4.5 M/UL — SIGNIFICANT CHANGE UP (ref 4.2–5.8)
RBC # FLD: 16.3 % — HIGH (ref 10.3–14.5)
SODIUM SERPL-SCNC: 140 MMOL/L — SIGNIFICANT CHANGE UP (ref 135–145)
WBC # BLD: 6.86 K/UL — SIGNIFICANT CHANGE UP (ref 3.8–10.5)
WBC # FLD AUTO: 6.86 K/UL — SIGNIFICANT CHANGE UP (ref 3.8–10.5)

## 2019-06-22 RX ORDER — MAGNESIUM SULFATE 500 MG/ML
1 VIAL (ML) INJECTION ONCE
Refills: 0 | Status: COMPLETED | OUTPATIENT
Start: 2019-06-22 | End: 2019-06-22

## 2019-06-22 RX ADMIN — Medication 15 MILLIGRAM(S): at 22:32

## 2019-06-22 RX ADMIN — Medication 15 MILLIGRAM(S): at 18:22

## 2019-06-22 RX ADMIN — Medication 1 APPLICATION(S): at 09:48

## 2019-06-22 RX ADMIN — Medication 1000 MILLIGRAM(S): at 07:54

## 2019-06-22 RX ADMIN — Medication 1000 MILLIGRAM(S): at 20:35

## 2019-06-22 RX ADMIN — Medication 100 GRAM(S): at 14:12

## 2019-06-22 RX ADMIN — HEPARIN SODIUM 5000 UNIT(S): 5000 INJECTION INTRAVENOUS; SUBCUTANEOUS at 15:58

## 2019-06-22 RX ADMIN — Medication 1000 MILLIGRAM(S): at 15:46

## 2019-06-22 RX ADMIN — Medication 15 MILLIGRAM(S): at 00:37

## 2019-06-22 RX ADMIN — Medication 15 MILLIGRAM(S): at 11:55

## 2019-06-22 RX ADMIN — HEPARIN SODIUM 5000 UNIT(S): 5000 INJECTION INTRAVENOUS; SUBCUTANEOUS at 22:33

## 2019-06-22 RX ADMIN — Medication 1000 MILLIGRAM(S): at 14:12

## 2019-06-22 RX ADMIN — HEPARIN SODIUM 5000 UNIT(S): 5000 INJECTION INTRAVENOUS; SUBCUTANEOUS at 07:53

## 2019-06-22 RX ADMIN — NEBIVOLOL HYDROCHLORIDE 10 MILLIGRAM(S): 5 TABLET ORAL at 22:29

## 2019-06-22 RX ADMIN — Medication 15 MILLIGRAM(S): at 11:46

## 2019-06-22 RX ADMIN — Medication 15 MILLIGRAM(S): at 22:35

## 2019-06-22 RX ADMIN — Medication 15 MILLIGRAM(S): at 18:36

## 2019-06-22 RX ADMIN — Medication 15 MILLIGRAM(S): at 05:32

## 2019-06-22 RX ADMIN — HEPARIN SODIUM 5000 UNIT(S): 5000 INJECTION INTRAVENOUS; SUBCUTANEOUS at 00:33

## 2019-06-22 RX ADMIN — Medication 15 MILLIGRAM(S): at 00:59

## 2019-06-22 RX ADMIN — Medication 1000 MILLIGRAM(S): at 19:15

## 2019-06-22 RX ADMIN — Medication 1000 MILLIGRAM(S): at 08:54

## 2019-06-22 RX ADMIN — Medication 15 MILLIGRAM(S): at 05:02

## 2019-06-22 RX ADMIN — Medication 1 APPLICATION(S): at 22:31

## 2019-06-23 ENCOUNTER — TRANSCRIPTION ENCOUNTER (OUTPATIENT)
Age: 64
End: 2019-06-23

## 2019-06-23 VITALS
RESPIRATION RATE: 17 BRPM | OXYGEN SATURATION: 97 % | DIASTOLIC BLOOD PRESSURE: 99 MMHG | HEART RATE: 79 BPM | TEMPERATURE: 99 F | SYSTOLIC BLOOD PRESSURE: 157 MMHG

## 2019-06-23 PROCEDURE — 83735 ASSAY OF MAGNESIUM: CPT

## 2019-06-23 PROCEDURE — 86901 BLOOD TYPING SEROLOGIC RH(D): CPT

## 2019-06-23 PROCEDURE — 84100 ASSAY OF PHOSPHORUS: CPT

## 2019-06-23 PROCEDURE — 86850 RBC ANTIBODY SCREEN: CPT

## 2019-06-23 PROCEDURE — 88304 TISSUE EXAM BY PATHOLOGIST: CPT

## 2019-06-23 PROCEDURE — 36415 COLL VENOUS BLD VENIPUNCTURE: CPT

## 2019-06-23 PROCEDURE — 86900 BLOOD TYPING SEROLOGIC ABO: CPT

## 2019-06-23 PROCEDURE — 82962 GLUCOSE BLOOD TEST: CPT

## 2019-06-23 PROCEDURE — 80048 BASIC METABOLIC PNL TOTAL CA: CPT

## 2019-06-23 PROCEDURE — 85027 COMPLETE CBC AUTOMATED: CPT

## 2019-06-23 RX ADMIN — Medication 15 MILLIGRAM(S): at 05:58

## 2019-06-23 RX ADMIN — Medication 15 MILLIGRAM(S): at 05:56

## 2019-06-23 NOTE — DISCHARGE NOTE PROVIDER - HOSPITAL COURSE
64M hx HTN, DM, complex diverticulitis s/p sigmoid resection, primary anastomosis and diverting loop ileostomy on 4/3/2019. Here for loop ileostomy reversal. Pt reports no issues with ileostomy. No f/c/n/v.  The patient went to the OR on 6/20/19 for ileostomy reversal. The patient tolerated the procedure well and there were no complications. Post operatively the patient passed his trial of void, had return of bowel function, and tolerating a low residue diet. The patient is being discharged to home and is to follow up with Dr. Murdock in 1 -2 weeks.

## 2019-06-23 NOTE — DISCHARGE NOTE NURSING/CASE MANAGEMENT/SOCIAL WORK - NSDCDPATPORTLINK_GEN_ALL_CORE
You can access the [facility] Portal, offered by [facility], by registering with the following website: [email or web address]

## 2019-06-23 NOTE — PROGRESS NOTE ADULT - ASSESSMENT
63 y/o M w/ DM, HTN, complex diverticulitis s/p resection w/ primary anastomosis and loop ileostomy creation now s/p ileostomy reversal 6/20.    ERAS protocol   CLD  pain/nausea control   SCDs/SQH/OOOBA/IS  am labs
63 y/o M w/ DM, HTN, complex diverticulitis s/p resection w/ primary anastomosis and loop ileostomy creation now s/p ileostomy reversal 6/20.    ERAS protocol   LRD  pain/nausea control   SCDs/SQH/OOOBA/IS  discharge to home
65 y/o M w/ DM, HTN, complex diverticulitis s/p resection w/ primary anastomosis and loop ileostomy creation now s/p ileostomy reversal 6/20.    ERAS protocol  Adv to LRD  pain/nausea control   SCDs/SQH/OOOBA/IS  am labs  Silvadene for blisters  dispo pending toleration of diet
Advance to low residue diet as tolerated  Local wound care  Dispo planning if continue GI function and tolerating diet  Discussed with senior resident Dr Dumas

## 2019-06-23 NOTE — PROGRESS NOTE ADULT - REASON FOR ADMISSION
Same Day Admission after Loop ileostomy reversal

## 2019-06-23 NOTE — DISCHARGE NOTE PROVIDER - NSDCCPCAREPLAN_GEN_ALL_CORE_FT
PRINCIPAL DISCHARGE DIAGNOSIS  Diagnosis: Status post reversal of ileostomy  Assessment and Plan of Treatment: Please resume all regular home medications unless specifically advised not to take a particular medication.  Please take pain medication as prescribed for severe pain.   Please get plenty of rest, continue to ambulate several times per day, and drink adequate amounts of fluids. Avoid lifting weights greater than 5-10 lbs until you follow-up with your surgeon, who will instruct you further regarding activity restrictions.  Avoid driving or operating heavy machinery while taking pain medications. Please follow-up with your surgeon  Dr. Murdock in 1 -2 weeks. Please call his office at [phone] to schedule an appointment.  Medications at discharge:  Please take percocet as needed for severe pain as prescribed.  Wound care:  Please keep blisters covered with gauze, you can apply bacitracin.   You may shower, no swimming or baths until cleared by your surgeon at your follow up appointement.

## 2019-06-23 NOTE — DISCHARGE NOTE PROVIDER - CARE PROVIDER_API CALL
Osiel Murdock)  ColonRectal Surgery; Surgery  [facility], 2nd Floor  [place], [place] [address]  Phone: [phone]  Fax: [phone]  Follow Up Time:

## 2019-06-23 NOTE — PROGRESS NOTE ADULT - SUBJECTIVE AND OBJECTIVE BOX
STATUS POST:  Closure, loop ileostomy      POST OPERATIVE DAY #: 1    SUBJECTIVE:   No acute events overnight.   Patient feels better, pain controlled, out of bed ambulating.  Currently tolerating CLD. Denies nausea/vomiting  -Bowel movements, +Flatus  Encouraged PO intake, out of bed ambulating, incentive spirometry    ---------------------------------------------------------------    Vital Signs Last 24 Hrs  T(C): 37.3 (21 Jun 2019 08:39), Max: 37.3 (20 Jun 2019 13:00)  T(F): 99.2 (21 Jun 2019 08:39), Max: 99.2 (21 Jun 2019 08:39)  HR: 83 (21 Jun 2019 08:39) (82 - 96)  BP: 129/73 (21 Jun 2019 08:39) (108/64 - 136/66)  BP(mean): 82 (20 Jun 2019 12:30) (78 - 93)  RR: 18 (21 Jun 2019 08:39) (13 - 21)  SpO2: 97% (21 Jun 2019 08:39) (94% - 97%)    I&O's Summary    20 Jun 2019 07:01  -  21 Jun 2019 07:00  --------------------------------------------------------  IN: 1180 mL / OUT: 1105 mL / NET: 75 mL        ----------------------------------------------------------------    Physical Exam:  General: NAD, Comfortable in bed     Neuro: A&Ox3  Respiratory: nonlabored breathing, no respiratory distress    CV:  Normal Sinus Rhythm, regular rate   Abd: soft, appropriately tender , mildly distended, prior ostomy site w/ dressing C/D/I  :  voiding  Extremities: WWP, nonedematous, SCDs in place      -------------------------------------------------------------------    LABS:                  RADIOLOGY & ADDITIONAL STUDIES:
24 hr events:  O/N: MAEGAN  6/21: chidi d/c'd, passed TOV, anthony CLD, +F/+BM    SUBJECTIVE:  Pt seen and examined by chief resident. Pt is doing well, resting comfortably on bed. Pain controlled. Diet tolerated. Ambulating out of bed. +F/+BM. No nausea or vomiting. No complaints at this time.      Vital Signs Last 24 Hrs  T(C): 36.8 (22 Jun 2019 12:10), Max: 36.8 (22 Jun 2019 05:57)  T(F): 98.3 (22 Jun 2019 12:10), Max: 98.3 (22 Jun 2019 05:57)  HR: 82 (22 Jun 2019 12:10) (81 - 85)  BP: 134/77 (22 Jun 2019 12:10) (126/67 - 153/87)  RR: 17 (22 Jun 2019 12:10) (16 - 18)  SpO2: 97% (22 Jun 2019 12:10) (97% - 97%)    Physical Exam:  General: NAD  Pulmonary: Nonlabored breathing, no respiratory distress  Abd: soft, nontender, mildly distended, prior ostomy site w/ dressing C/D/I. Blisters surrounding wound caused by tape.   Extremities: warm, well perfused.     I&O's Summary    21 Jun 2019 07:01  -  22 Jun 2019 07:00  --------------------------------------------------------  IN: 580 mL / OUT: 2900 mL / NET: -2320 mL    22 Jun 2019 07:01  -  22 Jun 2019 14:17  --------------------------------------------------------  IN: 360 mL / OUT: 0 mL / NET: 360 mL        LABS:                        13.5   6.86  )-----------( 227      ( 22 Jun 2019 08:48 )             41.6     06-22    140  |  107  |  19  ----------------------------<  119<H>  4.1   |  21<L>  |  1.02    Ca    9.3      22 Jun 2019 08:48  Phos  3.3     06-22  Mg     1.7     06-22          POCT Blood Glucose.: 137 mg/dL (21 Jun 2019 16:43)
CRS Attending for Dr Murdock  Pt seen and examined on morning rounds  No acute events overnight  +loose stools and flatus  Tolerating clear liquid diet    Vital Signs Last 24 Hrs  T(C): 36.6 (22 Jun 2019 08:28), Max: 36.8 (22 Jun 2019 05:57)  T(F): 97.8 (22 Jun 2019 08:28), Max: 98.3 (22 Jun 2019 05:57)  HR: 81 (22 Jun 2019 08:28) (81 - 85)  BP: 153/87 (22 Jun 2019 08:28) (126/67 - 153/87)  BP(mean): --  RR: 17 (22 Jun 2019 08:28) (16 - 18)  SpO2: 97% (22 Jun 2019 08:28) (97% - 97%)    I&O's Summary    21 Jun 2019 07:01  -  22 Jun 2019 07:00  --------------------------------------------------------  IN: 580 mL / OUT: 2900 mL / NET: -2320 mL    22 Jun 2019 07:01  -  22 Jun 2019 11:57  --------------------------------------------------------  IN: 360 mL / OUT: 0 mL / NET: 360 mL      Gen NAD  Adbomen soft obese nontender, superficial blistering of skin due to prior tape from dressing, prior ileostomy site clean, no erythema                          13.5   6.86  )-----------( 227      ( 22 Jun 2019 08:48 )             41.6     06-22    140  |  107  |  19  ----------------------------<  119<H>  4.1   |  21<L>  |  1.02    Ca    9.3      22 Jun 2019 08:48  Phos  3.3     06-22  Mg     1.7     06-22
Procedure: Ileostomy reversal  Surgeon: Collette     S: Pt complaints of some abdominal pain. Denies CP, SOB, [place], calf tenderness.     O:  T(C): 36.5 (06-20-19 @ 11:00), Max: 36.5 (06-20-19 @ 11:00)  T(F): 97.7 (06-20-19 @ 11:00), Max: 97.7 (06-20-19 @ 11:00)  HR: 92 (06-20-19 @ 12:30) (88 - 92)  BP: 120/64 (06-20-19 @ 12:30) (113/61 - 136/66)  RR: 16 (06-20-19 @ 12:30) (13 - 21)  SpO2: 95% (06-20-19 @ 12:30) (94% - 97%)  Wt(kg): --            Gen: NAD, resting comfortably in bed  C/V: NSR  Pulm: Nonlabored breathing, no respiratory distress  Abd: soft, ND, appropriately tender. Incision: CDI, WTD in old ileostomy site. no active bleed seen.  Extrem: WWP, no calf edema, SCDs in place      A/P: 64yMale s/p ileostomy reversal  ERAS protocol  Diet: CLD  IVF: LR  Pain/nausea control  DVT ppx: SCDs, SQH  Payan to gravity
SUBJECTIVE: Patient seen and examined bedside. Patient has no complaints. Patient reports that he is passing flatus and having bowel movements. Patient is tolerating low residue diet.    heparin  Injectable 5000 Unit(s) SubCutaneous every 8 hours  nebivolol 10 milliGRAM(s) Oral at bedtime      Vital Signs Last 24 Hrs  T(C): 37.4 (23 Jun 2019 08:10), Max: 37.4 (23 Jun 2019 08:10)  T(F): 99.3 (23 Jun 2019 08:10), Max: 99.3 (23 Jun 2019 08:10)  HR: 79 (23 Jun 2019 08:10) (74 - 82)  BP: 157/99 (23 Jun 2019 08:10) (132/78 - 157/99)  BP(mean): --  RR: 17 (23 Jun 2019 08:10) (17 - 18)  SpO2: 97% (23 Jun 2019 08:10) (97% - 98%)  I&O's Detail    22 Jun 2019 07:01  -  23 Jun 2019 07:00  --------------------------------------------------------  IN:    Oral Fluid: 360 mL  Total IN: 360 mL    OUT:  Total OUT: 0 mL    Total NET: 360 mL          General: NAD, resting comfortably in bed  C/V: NSR  Pulm: Nonlabored breathing, no respiratory distress  Abd: soft, NT/ND. surgical incision clean, dry, and intact. 2 small blisters near incisions 2/2 to tape reaction.  Extrem: WWP, no edema, SCDs in place        LABS:                        13.5   6.86  )-----------( 227      ( 22 Jun 2019 08:48 )             41.6     06-22    140  |  107  |  19  ----------------------------<  119<H>  4.1   |  21<L>  |  1.02    Ca    9.3      22 Jun 2019 08:48  Phos  3.3     06-22  Mg     1.7     06-22            RADIOLOGY & ADDITIONAL STUDIES:

## 2019-06-24 NOTE — PROVIDER CONTACT NOTE (MEDICATION) - SITUATION
[facility]...
Notified MD ALI of FS: 222 and no coverage for HS. Coverage is listed for 1700 and 0700.
Pt was due for metoprolol ivp at 6am. MD unable to push at this time

## 2019-06-25 LAB — SURGICAL PATHOLOGY STUDY: SIGNIFICANT CHANGE UP

## 2019-07-01 DIAGNOSIS — Z90.49 ACQUIRED ABSENCE OF OTHER SPECIFIED PARTS OF DIGESTIVE TRACT: ICD-10-CM

## 2019-07-01 DIAGNOSIS — E11.9 TYPE 2 DIABETES MELLITUS WITHOUT COMPLICATIONS: ICD-10-CM

## 2019-07-01 DIAGNOSIS — I10 ESSENTIAL (PRIMARY) HYPERTENSION: ICD-10-CM

## 2019-07-01 DIAGNOSIS — Z43.2 ENCOUNTER FOR ATTENTION TO ILEOSTOMY: ICD-10-CM

## 2019-07-08 ENCOUNTER — APPOINTMENT (OUTPATIENT)
Dept: COLORECTAL SURGERY | Facility: CLINIC | Age: 64
End: 2019-07-08
Payer: COMMERCIAL

## 2019-07-08 VITALS
TEMPERATURE: 98.3 F | WEIGHT: 222 LBS | BODY MASS INDEX: 33.65 KG/M2 | SYSTOLIC BLOOD PRESSURE: 173 MMHG | DIASTOLIC BLOOD PRESSURE: 96 MMHG | HEART RATE: 71 BPM | HEIGHT: 68 IN

## 2019-07-08 DIAGNOSIS — K57.32 DIVERTICULITIS OF LARGE INTESTINE W/OUT PERFORATION OR ABSCESS W/OUT BLEEDING: ICD-10-CM

## 2019-07-08 PROCEDURE — 99024 POSTOP FOLLOW-UP VISIT: CPT

## 2019-07-09 NOTE — ED PROVIDER NOTE - NS_EDPROVIDERDISPOUSERTYPE_ED_A_ED
Problem: Seizure Disorder (Adult)  Goal: *STG: Remains free of seizure activity  Outcome: Progressing Towards Goal  Goal: *STG: Maintains lab values within therapeutic range  Outcome: Progressing Towards Goal  Goal: *STG/LTG: Complies with medication therapy  Outcome: Progressing Towards Goal  Goal: *STG: Remains free of injury during seizure activity  Outcome: Progressing Towards Goal  Goal: *STG: Remains safe in hospital  Outcome: Progressing Towards Goal  Goal: Interventions  Outcome: Progressing Towards Goal     Problem: Falls - Risk of  Goal: *Absence of Falls  Description  Document Margrett Bernheim Fall Risk and appropriate interventions in the flowsheet.   Outcome: Progressing Towards Goal Attending Attestation (For Attendings USE Only)...

## 2019-07-11 DIAGNOSIS — L08.9 OTHER INJURY OF UNSPECIFIED BODY REGION, INITIAL ENCOUNTER: ICD-10-CM

## 2019-07-11 DIAGNOSIS — T14.8XXA OTHER INJURY OF UNSPECIFIED BODY REGION, INITIAL ENCOUNTER: ICD-10-CM

## 2019-07-11 RX ORDER — AMOXICILLIN AND CLAVULANATE POTASSIUM 875; 125 MG/1; MG/1
875-125 TABLET, COATED ORAL
Qty: 20 | Refills: 0 | Status: ACTIVE | COMMUNITY
Start: 2019-07-11 | End: 1900-01-01

## 2019-07-12 ENCOUNTER — OUTPATIENT (OUTPATIENT)
Dept: OUTPATIENT SERVICES | Facility: HOSPITAL | Age: 64
LOS: 1 days | Discharge: HOME | End: 2019-07-12
Payer: COMMERCIAL

## 2019-07-12 DIAGNOSIS — Z98.890 OTHER SPECIFIED POSTPROCEDURAL STATES: Chronic | ICD-10-CM

## 2019-07-12 DIAGNOSIS — T14.8XXA OTHER INJURY OF UNSPECIFIED BODY REGION, INITIAL ENCOUNTER: ICD-10-CM

## 2019-07-12 PROCEDURE — 74177 CT ABD & PELVIS W/CONTRAST: CPT | Mod: 26

## 2019-07-12 NOTE — PHYSICAL EXAM
[Abdomen Masses] : No abdominal masses [Abdomen Tenderness] : ~T No ~M abdominal tenderness [de-identified] : wound- clean- superficial granulation tissue

## 2019-07-12 NOTE — HISTORY OF PRESENT ILLNESS
[FreeTextEntry1] : 65 yo M w/ PMH diverticulitis presents for post op follow up, s/p ileostomy reversal 6/20/19\par \par Reports some abdominal discomfort w/ movement or palpation\par Yellow-greenish discharge w/o odor noted a couple days ago\par Was rinsing w/ saline immediately after surgery, now just applying gauze topically, not packing wound\par Appetite good, denies fever\par BH: 3x daily after meals, soft, formed\par Has advanced dietary fiber, denies use of fiber supplements\par \par Surgical Final Report\par Final Diagnosis\par \par 1.  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

## 2019-07-12 NOTE — ASSESSMENT
[FreeTextEntry1] : Doing well. \par Advised packing wound daily to the depth.\par \par avoid lifting for 2 weeks.\par \par liberalize diet

## 2019-07-12 NOTE — CONSULT LETTER
[( Thank you for referring [unfilled] for consultation for _____ )] : Thank you for referring [unfilled] for consultation for [unfilled] [Consult Letter:] : I had the pleasure of evaluating your patient, [unfilled]. [Dear  ___] : Dear  [unfilled], [Consult Closing:] : Thank you very much for allowing me to participate in the care of this patient.  If you have any questions, please do not hesitate to contact me. [Please see my note below.] : Please see my note below. [Sincerely,] : Sincerely, [FreeTextEntry3] : YNES GARCIA MD [FreeTextEntry2] : [facility]\par [address]  \[place] [address]\par

## 2019-09-06 NOTE — DISCHARGE NOTE ADULT - NS MD DC FALL RISK RISK
For information on Fall & Injury Prevention, visit [email or web address]
no

## 2020-05-20 NOTE — PATIENT PROFILE ADULT - NSPROGENDIFFINTUB_GEN_A_NUR
Andria scheduled for a CPE on 6-22-20.  
Medication: loratadine (CLARITIN) 10 MG   Last Refill: 11/5/2019 #90 R: 1  Last OV: 5/3/2019  Next OV:  None-  Due 5/2020     Refill per Protocol    Spoke with patient/patient mom advised due for annual physical. Patients mom advised will call back to schedule CPE.  
previously intubated - problems

## 2020-06-04 NOTE — ASU PATIENT PROFILE, ADULT - IS PATIENT PREGNANT?
Called patient and informed that we had to cancel his 8/19/20 at [facility]. [facility] as we are no longer seeing patient's at that location. Patient was agreeable to this. Gave verbal approval to call Wing Junior and give the info to her. Called and informed Wing Junior that this appointment is now on 8/19/20 1:45 pm at the [facility]. New appointment info letter sent.
not applicable (Male)

## 2022-09-02 NOTE — PATIENT PROFILE ADULT - BRADEN MOBILITY
(3) slightly limited Cimetidine Counseling:  I discussed with the patient the risks of Cimetidine including but not limited to gynecomastia, headache, diarrhea, nausea, drowsiness, arrhythmias, pancreatitis, skin rashes, psychosis, bone marrow suppression and kidney toxicity.

## 2023-02-20 NOTE — PRE-OP CHECKLIST - BLOOD AVAILABLE
Results for orders placed or performed in visit on 02/20/23   AMB POC LEAD   Result Value Ref Range    Lead level (POC) <3.3 mcg/dL   AMB POC HEMOGLOBIN (HGB)   Result Value Ref Range    Hemoglobin (POC) 12.8 G/DL n/a

## 2025-01-01 ENCOUNTER — RESULT REVIEW (OUTPATIENT)
Age: 70
End: 2025-01-01